# Patient Record
Sex: MALE | Race: WHITE | NOT HISPANIC OR LATINO | Employment: FULL TIME | URBAN - METROPOLITAN AREA
[De-identification: names, ages, dates, MRNs, and addresses within clinical notes are randomized per-mention and may not be internally consistent; named-entity substitution may affect disease eponyms.]

---

## 2017-10-09 NOTE — PROGRESS NOTES
SUBJECTIVE:   CC: Ever Garnica is an 52 year old male who presents for preventative health visit.     The patient is doing well and works out very reg.  No skin outbreaks or herpes issues.  For 2 months when bends over some tightness in lb, not any other time, no leg t/n/w, no incont, no f,c,s.  No gi or gu c/o.  No other c/o on review of systems.    Healthy Habits:    Do you get at least three servings of calcium containing foods daily (dairy, green leafy vegetables, etc.)? yes    Amount of exercise or daily activities, outside of work: 7 day(s) per week    Problems taking medications regularly No    Medication side effects: No    Have you had an eye exam in the past two years? yes    Do you see a dentist twice per year? yes    Do you have sleep apnea, excessive snoring or daytime drowsiness?no              Today's PHQ-2 Score: PHQ-2 ( 1999 Pfizer) 3/3/2016 2/9/2016   Q1: Little interest or pleasure in doing things 0 0   Q2: Feeling down, depressed or hopeless 0 0   PHQ-2 Score 0 0         Abuse: Current or Past(Physical, Sexual or Emotional)- No  Do you feel safe in your environment - Yes  Social History   Substance Use Topics     Smoking status: Never Smoker     Smokeless tobacco: Never Used     Alcohol use 0.0 oz/week     0 Standard drinks or equivalent per week      Comment: rarely     The patient does not drink >3 drinks per day nor >7 drinks per week.                Past Medical History:      Past Medical History:   Diagnosis Date     Genital herpes 2015    buttock paresthesia, urine symptoms     H/O colonoscopy 2016    nl     Hypothyroid 8/15    not on meds     Seasonal allergies              Past Surgical History:      Past Surgical History:   Procedure Laterality Date     C ANESTH,REPAIR LO ABD HERNIA NOS      age less than 10 years     clavicular surgery for fx  2013     COLONOSCOPY N/A 3/10/2016    Procedure: COLONOSCOPY;  Surgeon: Frank Ayala MD;  Location:  GI     HERNIA REPAIR  2005    bilat      VASECTOMY  10/2008             Social History:     Social History     Social History     Marital status:      Spouse name: N/A     Number of children: 3     Years of education: N/A     Occupational History     , [employer]     Social History Main Topics     Smoking status: Never Smoker     Smokeless tobacco: Never Used     Alcohol use No      Comment: 0     Drug use: No     Sexual activity: Yes     Partners: Female     Other Topics Concern      Service No     Blood Transfusions No     Exercise No     not scheduled     Seat Belt Yes     Social History Narrative             Family History:   reviewed         Allergies:   No Known Allergies          Medications:     Current Outpatient Prescriptions   Medication Sig Dispense Refill     fluticasone (FLONASE) 50 MCG/ACT nasal spray Spray 2 sprays into both nostrils daily (Patient not taking: Reported on 10/10/2017) 1 Package 5               Review of Systems:   The 10 point Review of Systems is negative other than noted in the HPI           Physical Exam:   Blood pressure 126/77, pulse 71, temperature 97.8  F (36.6  C), temperature source Oral, height 6' (1.829 m), weight 185 lb (83.9 kg), SpO2 98 %.    Exam:  Constitutional: healthy appearing, alert and in no distress  Heent: Normocephalic. Head without obvious masses or lesions. PERRLDC, EOMI. Mouth exam within normal limits: tongue, mucous membranes, posterior pharynx all normal, no lesions or abnormalities seen.  Tm's and canals within normal limits bilaterally. Neck supple, no nuchal rigidity or masses. No supraclavicular, or cervical adenopathy. Thyroid symmetric, no masses.  Cardiovascular: Regular rate and rhythm, no murmer, rub or gallops.  JVP not elevated, no edema.  Carotids within normal limits bilaterally, no bruits.  Respiratory: Normal respiratory effort.  Lungs clear, normal flow, no wheezing or crackles.  Breasts: Normal bilaterally.  No masses or  lesions.  Nipples within normal limites.  No axillary lesions or nodes.  Gastrointestinal: Normal active bowel sounds.   Soft, not tender, no masses, guarding or rebound.  No hepatosplenomegaly.   Genitourinary: Rectal min bph  Musculoskeletal: extremities normal, no gross deformities noted.  Back within normal limits, no lesions or tenderness  Skin: no suspicious lesions or rashes   Neurologic: Mental status within normal limits.  Speech fluent.  No gross motor abnormalities and gait intact.  Psychiatric: mentation appears normal and affect normal.         Data:   Labs sent        Assessment:   1. Normal cpx  2. Low back pain, suspect msk, doubt tumor, cord, etc  3. Hypothyroidism, follow up labs  4. Hsv, no issues  5. Allergies  6. hcm         Plan:   Letter with labs  Call if cont low back pain  Exercise, diet  Up to date colon and immunizations         Frank Elizabeth M.D.

## 2017-10-10 ENCOUNTER — OFFICE VISIT (OUTPATIENT)
Dept: FAMILY MEDICINE | Facility: CLINIC | Age: 52
End: 2017-10-10
Payer: COMMERCIAL

## 2017-10-10 VITALS
DIASTOLIC BLOOD PRESSURE: 77 MMHG | WEIGHT: 185 LBS | BODY MASS INDEX: 25.06 KG/M2 | TEMPERATURE: 97.8 F | OXYGEN SATURATION: 98 % | HEART RATE: 71 BPM | SYSTOLIC BLOOD PRESSURE: 126 MMHG | HEIGHT: 72 IN

## 2017-10-10 DIAGNOSIS — J30.2 SEASONAL ALLERGIC RHINITIS, UNSPECIFIED CHRONICITY, UNSPECIFIED TRIGGER: ICD-10-CM

## 2017-10-10 DIAGNOSIS — E03.9 HYPOTHYROIDISM, UNSPECIFIED TYPE: ICD-10-CM

## 2017-10-10 DIAGNOSIS — A60.00 HERPES SIMPLEX INFECTION OF GENITOURINARY SYSTEM: ICD-10-CM

## 2017-10-10 DIAGNOSIS — Z00.00 ROUTINE GENERAL MEDICAL EXAMINATION AT A HEALTH CARE FACILITY: Primary | ICD-10-CM

## 2017-10-10 LAB
ERYTHROCYTE [DISTWIDTH] IN BLOOD BY AUTOMATED COUNT: 13.6 % (ref 10–15)
HCT VFR BLD AUTO: 45.1 % (ref 40–53)
HGB BLD-MCNC: 15.8 G/DL (ref 13.3–17.7)
MCH RBC QN AUTO: 31.3 PG (ref 26.5–33)
MCHC RBC AUTO-ENTMCNC: 35 G/DL (ref 31.5–36.5)
MCV RBC AUTO: 89 FL (ref 78–100)
PLATELET # BLD AUTO: 209 10E9/L (ref 150–450)
RBC # BLD AUTO: 5.05 10E12/L (ref 4.4–5.9)
WBC # BLD AUTO: 3.7 10E9/L (ref 4–11)

## 2017-10-10 PROCEDURE — 80053 COMPREHEN METABOLIC PANEL: CPT | Performed by: INTERNAL MEDICINE

## 2017-10-10 PROCEDURE — 84443 ASSAY THYROID STIM HORMONE: CPT | Performed by: INTERNAL MEDICINE

## 2017-10-10 PROCEDURE — 85027 COMPLETE CBC AUTOMATED: CPT | Performed by: INTERNAL MEDICINE

## 2017-10-10 PROCEDURE — 84439 ASSAY OF FREE THYROXINE: CPT | Performed by: INTERNAL MEDICINE

## 2017-10-10 PROCEDURE — G0103 PSA SCREENING: HCPCS | Performed by: INTERNAL MEDICINE

## 2017-10-10 PROCEDURE — 99396 PREV VISIT EST AGE 40-64: CPT | Performed by: INTERNAL MEDICINE

## 2017-10-10 PROCEDURE — 80061 LIPID PANEL: CPT | Performed by: INTERNAL MEDICINE

## 2017-10-10 PROCEDURE — 36415 COLL VENOUS BLD VENIPUNCTURE: CPT | Performed by: INTERNAL MEDICINE

## 2017-10-10 NOTE — NURSING NOTE
Chief Complaint   Patient presents with     Physical       Initial /77  Pulse 71  Temp 97.8  F (36.6  C) (Oral)  Ht 6' (1.829 m)  Wt 185 lb (83.9 kg)  SpO2 98%  BMI 25.09 kg/m2 Estimated body mass index is 25.09 kg/(m^2) as calculated from the following:    Height as of this encounter: 6' (1.829 m).    Weight as of this encounter: 185 lb (83.9 kg).  Medication Reconciliation: bob FUENTES CMA

## 2017-10-10 NOTE — LETTER
[facility]  [address] [facility] [address]  [place]  [address]  Tel: [phone]    October 11, 2017    Ever Garnica  [address] [address]        Dear Mr. Bryantman,    I am happy to report that your cbc or complete blood count is normal with no signs of anemia, leukemia or platelet abnormalities.  Your chemistry panel shows no signs of diabetes.  Your blood salts, kidney tests, liver tests, psa, thyroid tests, and proteins are all fine.    Your total cholesterol is 210 with the normal range being below 200.  Your HDL or good cholesterol is 74 with the normal range being above 40.  Your LDL or bad cholesterol is 125 with the normal range being below 130.  These numbers are all improved from before.    I am happy to bring you this overall excellent report.  I believe your health is very good.     If you have any further questions or problems, please contact our office.      Sincerely,    Frank Elizabeth MD/ Gabriella FUENTES CMA  Results for orders placed or performed in visit on 10/10/17   CBC with platelets   Result Value Ref Range    WBC 3.7 (L) 4.0 - 11.0 10e9/L    RBC Count 5.05 4.4 - 5.9 10e12/L    Hemoglobin 15.8 13.3 - 17.7 g/dL    Hematocrit 45.1 40.0 - 53.0 %    MCV 89 78 - 100 fl    MCH 31.3 26.5 - 33.0 pg    MCHC 35.0 31.5 - 36.5 g/dL    RDW 13.6 10.0 - 15.0 %    Platelet Count 209 150 - 450 10e9/L   Comprehensive metabolic panel   Result Value Ref Range    Sodium 140 133 - 144 mmol/L    Potassium 4.5 3.4 - 5.3 mmol/L    Chloride 105 94 - 109 mmol/L    Carbon Dioxide 25 20 - 32 mmol/L    Anion Gap 10 3 - 14 mmol/L    Glucose 94 70 - 99 mg/dL    Urea Nitrogen 21 7 - 30 mg/dL    Creatinine 1.14 0.66 - 1.25 mg/dL    GFR Estimate 67 >60 mL/min/1.7m2    GFR Estimate If Black 82 >60 mL/min/1.7m2    Calcium 9.0 8.5 - 10.1 mg/dL    Bilirubin Total 0.6 0.2 - 1.3 mg/dL    Albumin 4.0 3.4 - 5.0 g/dL    Protein Total 7.7 6.8 - 8.8 g/dL    Alkaline Phosphatase 60 40 - 150 U/L    ALT 18 0  - 70 U/L    AST 25 0 - 45 U/L   Lipid panel reflex to direct LDL   Result Value Ref Range    Cholesterol 210 (H) <200 mg/dL    Triglycerides 57 <150 mg/dL    HDL Cholesterol 74 >39 mg/dL    LDL Cholesterol Calculated 125 (H) <100 mg/dL    Non HDL Cholesterol 136 (H) <130 mg/dL   Prostate spec antigen screen   Result Value Ref Range    PSA 0.72 0 - 4 ug/L   TSH with free T4 reflex   Result Value Ref Range    TSH 5.20 (H) 0.40 - 4.00 mU/L   T4 free   Result Value Ref Range    T4 Free 1.06 0.76 - 1.46 ng/dL               Enclosure: Lab Results

## 2017-10-10 NOTE — MR AVS SNAPSHOT
After Visit Summary   10/10/2017    Ever Garnica    MRN: [id number]           Patient Information     Date Of Birth          1965        Visit Information        Provider Department      10/10/2017 11:30 AM Frank Elizabeth MD [facility]        Today's Diagnoses     Routine general medical examination at a health care facility    -  1    Hypothyroidism, unspecified type        Herpes simplex infection of genitourinary system        Seasonal allergic rhinitis, unspecified chronicity, unspecified trigger          Care Instructions      Preventive Health Recommendations  Male Ages 50 - 64    Yearly exam:             See your health care provider every year in order to  o   Review health changes.   o   Discuss preventive care.    o   Review your medicines if your doctor has prescribed any.     Have a cholesterol test every 5 years, or more frequently if you are at risk for high cholesterol/heart disease.     Have a diabetes test (fasting glucose) every three years. If you are at risk for diabetes, you should have this test more often.     Have a colonoscopy at age 50, or have a yearly FIT test (stool test). These exams will check for colon cancer.      Talk with your health care provider about whether or not a prostate cancer screening test (PSA) is right for you.    You should be tested each year for STDs (sexually transmitted diseases), if you re at risk.     Shots: Get a flu shot each year. Get a tetanus shot every 10 years.     Nutrition:    Eat at least 5 servings of fruits and vegetables daily.     Eat whole-grain bread, whole-wheat pasta and brown rice instead of white grains and rice.     Talk to your provider about Calcium and Vitamin D.     Lifestyle    Exercise for at least 150 minutes a week (30 minutes a day, 5 days a week). This will help you control your weight and prevent disease.     Limit alcohol to one drink per day.     No smoking.     Wear sunscreen to prevent  skin cancer.     See your dentist every six months for an exam and cleaning.     See your eye doctor every 1 to 2 years.            Follow-ups after your visit        Your next 10 appointments already scheduled     Oct 10, 2017 11:30 AM CDT   [facility]YS[facility] with [facility] MD   [facility]    [address] [facility] [address] [phone]              Who to contact     If you have questions or need follow up information about today's clinic visit or your schedule please contact [facility] directly at [phone].  Normal or non-critical lab and imaging results will be communicated to you by [employer], letter or phone within 4 business days after the clinic has received the results. If you do not hear from us within 7 days, please contact the clinic through [employer] or phone. If you have a critical or abnormal lab result, we will notify you by phone as soon as possible.  Submit refill requests through [employer] or call your pharmacy and they will forward the refill request to us. Please allow 3 business days for your refill to be completed.          Additional Information About Your Visit        Hi-[employer] Information     [employer] gives you secure access to your electronic health record. If you see a primary care provider, you can also send messages to your care team and make appointments. If you have questions, please call your primary care clinic.  If you do not have a primary care provider, please call [phone] and they will assist you.        Care EveryWhere ID     This is your Care EveryWhere ID. This could be used by other organizations to access your [place] medical records  [id number]        Your Vitals Were     Pulse Temperature Height Pulse Oximetry BMI (Body Mass Index)       71 97.8  F (36.6  C) (Oral) 6' (1.829 m) 98% 25.09 kg/m2        Blood Pressure from Last 3 Encounters:   10/10/17 126/77   03/10/16 107/74   03/03/16 114/72    Weight from  Last 3 Encounters:   10/10/17 185 lb (83.9 kg)   03/03/16 185 lb (83.9 kg)   02/28/16 185 lb (83.9 kg)              We Performed the Following     CBC with platelets     Comprehensive metabolic panel     Lipid panel reflex to direct LDL     Prostate spec antigen screen     TSH with free T4 reflex        Primary Care Provider Office Phone # Fax #    Frank Elizabeth [phone] [address]  [facility] [address]        Equal Access to Services     MARISELA GROVER : Hadii aad ku hadasho Soomaali, waaxda luqadaha, qaybta kaalmada adeegyada, waxay idiin hayaan adeeg khyvonne banks . So [facility] [phone].    ATENCIÓN: Si habla español, tiene a orellana disposición servicios gratuitos de asistencia lingüística. [facility] [phone].    We comply with applicable federal civil rights laws and [place] laws. We do not discriminate on the basis of race, color, national origin, age, disability, sex, sexual orientation, or gender identity.            Thank you!     Thank you for choosing [facility]  for your care. Our goal is always to provide you with excellent care. Hearing back from our patients is one way we can continue to improve our services. Please take a few minutes to complete the written survey that you may receive in the mail after your visit with us. Thank you!             Your Updated Medication List - Protect others around you: Learn how to safely use, store and throw away your medicines at www.disposemymeds.org.          This list is accurate as of: 10/10/17 11:28 AM.  Always use your most recent med list.                   Brand Name Dispense Instructions for use Diagnosis    fluticasone 50 MCG/ACT spray    FLONASE    1 Package    Spray 2 sprays into both nostrils daily    Seasonal allergic rhinitis

## 2017-10-11 LAB
ALBUMIN SERPL-MCNC: 4 G/DL (ref 3.4–5)
ALP SERPL-CCNC: 60 U/L (ref 40–150)
ALT SERPL W P-5'-P-CCNC: 18 U/L (ref 0–70)
ANION GAP SERPL CALCULATED.3IONS-SCNC: 10 MMOL/L (ref 3–14)
AST SERPL W P-5'-P-CCNC: 25 U/L (ref 0–45)
BILIRUB SERPL-MCNC: 0.6 MG/DL (ref 0.2–1.3)
BUN SERPL-MCNC: 21 MG/DL (ref 7–30)
CALCIUM SERPL-MCNC: 9 MG/DL (ref 8.5–10.1)
CHLORIDE SERPL-SCNC: 105 MMOL/L (ref 94–109)
CHOLEST SERPL-MCNC: 210 MG/DL
CO2 SERPL-SCNC: 25 MMOL/L (ref 20–32)
CREAT SERPL-MCNC: 1.14 MG/DL (ref 0.66–1.25)
GFR SERPL CREATININE-BSD FRML MDRD: 67 ML/MIN/1.7M2
GLUCOSE SERPL-MCNC: 94 MG/DL (ref 70–99)
HDLC SERPL-MCNC: 74 MG/DL
LDLC SERPL CALC-MCNC: 125 MG/DL
NONHDLC SERPL-MCNC: 136 MG/DL
POTASSIUM SERPL-SCNC: 4.5 MMOL/L (ref 3.4–5.3)
PROT SERPL-MCNC: 7.7 G/DL (ref 6.8–8.8)
PSA SERPL-ACNC: 0.72 UG/L (ref 0–4)
SODIUM SERPL-SCNC: 140 MMOL/L (ref 133–144)
T4 FREE SERPL-MCNC: 1.06 NG/DL (ref 0.76–1.46)
TRIGL SERPL-MCNC: 57 MG/DL
TSH SERPL DL<=0.005 MIU/L-ACNC: 5.2 MU/L (ref 0.4–4)

## 2017-10-11 NOTE — PROGRESS NOTES
Mr. Garnica,    It was a pleasure seeing you for your physical examination.  I wanted to get back to you with your test results.  I have enclosed a copy for your review.      I am happy to report that your cbc or complete blood count is normal with no signs of anemia, leukemia or platelet abnormalities.  Your chemistry panel shows no signs of diabetes.  Your blood salts, kidney tests, liver tests, psa, thyroid tests, and proteins are all fine.    Your total cholesterol is 210 with the normal range being below 200.  Your HDL or good cholesterol is 74 with the normal range being above 40.  Your LDL or bad cholesterol is 125 with the normal range being below 130.  These numbers are all improved from before.    I am happy to bring you this overall excellent report.  I believe your health is very good.  If you have any questions please call me.    Frank Elizabeth M.D.

## 2019-03-15 ENCOUNTER — ANCILLARY PROCEDURE (OUTPATIENT)
Dept: GENERAL RADIOLOGY | Facility: CLINIC | Age: 54
End: 2019-03-15
Attending: INTERNAL MEDICINE
Payer: COMMERCIAL

## 2019-03-15 ENCOUNTER — OFFICE VISIT (OUTPATIENT)
Dept: FAMILY MEDICINE | Facility: CLINIC | Age: 54
End: 2019-03-15
Payer: COMMERCIAL

## 2019-03-15 VITALS
TEMPERATURE: 98.2 F | SYSTOLIC BLOOD PRESSURE: 112 MMHG | WEIGHT: 197 LBS | HEART RATE: 69 BPM | BODY MASS INDEX: 26.68 KG/M2 | OXYGEN SATURATION: 96 % | HEIGHT: 72 IN | DIASTOLIC BLOOD PRESSURE: 70 MMHG

## 2019-03-15 DIAGNOSIS — M54.42 CHRONIC LEFT-SIDED LOW BACK PAIN WITH LEFT-SIDED SCIATICA: ICD-10-CM

## 2019-03-15 DIAGNOSIS — M79.672 LEFT FOOT PAIN: ICD-10-CM

## 2019-03-15 DIAGNOSIS — G89.29 CHRONIC LEFT-SIDED LOW BACK PAIN WITH LEFT-SIDED SCIATICA: ICD-10-CM

## 2019-03-15 DIAGNOSIS — M79.672 LEFT FOOT PAIN: Primary | ICD-10-CM

## 2019-03-15 PROCEDURE — 99214 OFFICE O/P EST MOD 30 MIN: CPT | Performed by: INTERNAL MEDICINE

## 2019-03-15 PROCEDURE — 73630 X-RAY EXAM OF FOOT: CPT | Mod: LT

## 2019-03-15 PROCEDURE — 72100 X-RAY EXAM L-S SPINE 2/3 VWS: CPT

## 2019-03-15 ASSESSMENT — MIFFLIN-ST. JEOR: SCORE: 1776.59

## 2019-03-15 NOTE — PROGRESS NOTES
SUBJECTIVE:   Ever Garnica is a 53 year old male who presents to clinic today for the following health issues:      Left foot pain on and off for a couple of months, no known injuries      Left foot Pain    Duration:     Since: several months           Specific cause: none    Description:      Location of pain: left lateral foot     Character of pain: dull     Pain radiation: none    Intensity: moderate    History:      Pain interferes with job: no     History of similar pain problems: yes     Any previous MRI or X-rays: no     Therapies tried without relief: none    Alleviating factors:      Improved by: none    Precipitating factors:    Worsened by: walking long distance    Accompanying Signs & Symptoms: left sciatica            Current Medications:     Current Outpatient Medications   Medication Sig Dispense Refill     fluticasone (FLONASE) 50 MCG/ACT nasal spray Spray 2 sprays into both nostrils daily (Patient not taking: Reported on 10/10/2017) 1 Package 5         Allergies:    No Known Allergies         Past Medical History:     Past Medical History:   Diagnosis Date     Genital herpes 2015    buttock paresthesia, urine symptoms     H/O colonoscopy 2016    nl     Hypothyroid 8/15    not on meds     Seasonal allergies          Past Surgical History:     Past Surgical History:   Procedure Laterality Date     C ANESTH,REPAIR LO ABD HERNIA NOS      age less than 10 years     clavicular surgery for fx  2013     COLONOSCOPY N/A 3/10/2016    Procedure: COLONOSCOPY;  Surgeon: Frank Ayala MD;  Location:  GI     HERNIA REPAIR  2005    bilat     VASECTOMY  10/2008         Family Medical History:     Family History   Problem Relation Age of Onset     Lipids Father      C.A.D. Paternal Grandmother         heart attack late 60's, no other heart disease in the family     Cancer - colorectal Paternal Uncle         but no closer fam hx of colon cancer     Diabetes No family hx of      Cerebrovascular Disease No  family hx of      Breast Cancer No family hx of      Prostate Cancer No family hx of      Eye Disorder No family hx of         no glaucoma         Social History:     Social History     Socioeconomic History     Marital status:      Spouse name: Not on file     Number of children: 3     Years of education: Not on file     Highest education level: Not on file   Occupational History     Occupation: , Mathmetician     Employer: [employer]   Social Needs     Financial resource strain: Not on file     Food insecurity:     Worry: Not on file     Inability: Not on file     Transportation needs:     Medical: Not on file     Non-medical: Not on file   Tobacco Use     Smoking status: Never Smoker     Smokeless tobacco: Never Used   Substance and Sexual Activity     Alcohol use: No     Alcohol/week: 0.0 oz     Comment: 0     Drug use: No     Sexual activity: Yes     Partners: Female   Lifestyle     Physical activity:     Days per week: Not on file     Minutes per session: Not on file     Stress: Not on file   Relationships     Social connections:     Talks on phone: Not on file     Gets together: Not on file     Attends Restoration service: Not on file     Active member of club or organization: Not on file     Attends meetings of clubs or organizations: Not on file     Relationship status: Not on file     Intimate partner violence:     Fear of current or ex partner: Not on file     Emotionally abused: Not on file     Physically abused: Not on file     Forced sexual activity: Not on file   Other Topics Concern      Service No     Blood Transfusions No     Caffeine Concern Not Asked     Occupational Exposure Not Asked     Hobby Hazards Not Asked     Sleep Concern Not Asked     Stress Concern Not Asked     Weight Concern Not Asked     Special Diet Not Asked     Back Care Not Asked     Exercise No     Comment: not scheduled     Bike Helmet Not Asked     Seat Belt Yes     Self-Exams Not Asked      Parent/sibling w/ CABG, MI or angioplasty before 65F 55M? Not Asked   Social History Narrative     Not on file           Review of System:     Constitutional: Negative for fever or chills  Skin: Negative for rashes  Ears/Nose/Throat: Negative for nasal congestion, sore throat  Respiratory: No shortness of breath, dyspnea on exertion, cough, or hemoptysis  Cardiovascular: Negative for chest pain  Gastrointestinal: Negative for nausea, vomiting  Genitourinary: Negative for dysuria, hematuria  Musculoskeletal: Positive for mechanical low back pains and left foot pains  Neurologic: Negative for headaches  Psychiatric: Negative for depression, anxiety  Hematologic/Lymphatic/Immunologic: Negative  Endocrine: Negative  Behavioral: Negative for tobacco use       Physical Exam:   /70 (BP Location: Left arm, Patient Position: Sitting, Cuff Size: Adult Regular)   Pulse 69   Temp 98.2  F (36.8  C) (Oral)   Ht 1.829 m (6')   Wt 89.4 kg (197 lb)   SpO2 96%   BMI 26.72 kg/m      GENERAL: alert and no distress  EYES: eyes grossly normal to inspection, and conjunctivae and sclerae normal  HENT: Normocephalic atraumatic. Nose and mouth without ulcers or lesions  NECK: supple  RESP: lungs clear to auscultation   CV: regular rate and rhythm, normal S1 S2  LYMPH: no peripheral edema   ABDOMEN: nondistended  MS: left lower back pains with sciatica symptoms and left lateral foot pains noted with no signs of acute trauma or injuries  SKIN: no suspicious lesions or rashes  NEURO: Alert & Oriented x 3.   PSYCH: mentation appears normal, affect normal        Diagnostic Test Results:     Diagnostic Test Results:  Results for orders placed or performed in visit on 10/10/17   CBC with platelets   Result Value Ref Range    WBC 3.7 (L) 4.0 - 11.0 10e9/L    RBC Count 5.05 4.4 - 5.9 10e12/L    Hemoglobin 15.8 13.3 - 17.7 g/dL    Hematocrit 45.1 40.0 - 53.0 %    MCV 89 78 - 100 fl    MCH 31.3 26.5 - 33.0 pg    MCHC 35.0 31.5 - 36.5 g/dL     RDW 13.6 10.0 - 15.0 %    Platelet Count 209 150 - 450 10e9/L   Comprehensive metabolic panel   Result Value Ref Range    Sodium 140 133 - 144 mmol/L    Potassium 4.5 3.4 - 5.3 mmol/L    Chloride 105 94 - 109 mmol/L    Carbon Dioxide 25 20 - 32 mmol/L    Anion Gap 10 3 - 14 mmol/L    Glucose 94 70 - 99 mg/dL    Urea Nitrogen 21 7 - 30 mg/dL    Creatinine 1.14 0.66 - 1.25 mg/dL    GFR Estimate 67 >60 mL/min/1.7m2    GFR Estimate If Black 82 >60 mL/min/1.7m2    Calcium 9.0 8.5 - 10.1 mg/dL    Bilirubin Total 0.6 0.2 - 1.3 mg/dL    Albumin 4.0 3.4 - 5.0 g/dL    Protein Total 7.7 6.8 - 8.8 g/dL    Alkaline Phosphatase 60 40 - 150 U/L    ALT 18 0 - 70 U/L    AST 25 0 - 45 U/L   Lipid panel reflex to direct LDL   Result Value Ref Range    Cholesterol 210 (H) <200 mg/dL    Triglycerides 57 <150 mg/dL    HDL Cholesterol 74 >39 mg/dL    LDL Cholesterol Calculated 125 (H) <100 mg/dL    Non HDL Cholesterol 136 (H) <130 mg/dL   Prostate spec antigen screen   Result Value Ref Range    PSA 0.72 0 - 4 ug/L   TSH with free T4 reflex   Result Value Ref Range    TSH 5.20 (H) 0.40 - 4.00 mU/L   T4 free   Result Value Ref Range    T4 Free 1.06 0.76 - 1.46 ng/dL       ASSESSMENT/PLAN:       (M79.672) Left foot pain  (primary encounter diagnosis)  Comment: chronic left foot pains of unclear etiology  Plan: XR Foot Left G/E 3 Views, PODIATRY/FOOT & ANKLE        SURGERY REFERRAL, in the meantime, the patient is advised to take OTC pain medication including low dose tylenol for pain relief going forward.      (M54.42,  G89.29) Chronic left-sided low back pain with left-sided sciatica  Comment: chronic left sided low back pains with left sciatica  Plan: ORTHO  REFERRAL, XR Lumbar Spine 2/3 Views, in the meantime, the patient is advised to take OTC pain medication including low dose tylenol for pain relief going forward.    Follow Up Plan:     Patient is instructed to return to Internal Medicine clinic for follow-up visit in 1  week.        Fabiana Cabral MD  Internal Medicine  [facility]

## 2019-03-18 ENCOUNTER — OFFICE VISIT (OUTPATIENT)
Dept: NEUROSURGERY | Facility: CLINIC | Age: 54
End: 2019-03-18
Attending: PHYSICIAN ASSISTANT
Payer: COMMERCIAL

## 2019-03-18 VITALS
HEIGHT: 72 IN | SYSTOLIC BLOOD PRESSURE: 118 MMHG | DIASTOLIC BLOOD PRESSURE: 68 MMHG | HEART RATE: 63 BPM | WEIGHT: 190 LBS | OXYGEN SATURATION: 97 % | TEMPERATURE: 98.5 F | BODY MASS INDEX: 25.73 KG/M2

## 2019-03-18 DIAGNOSIS — R20.2 PARESTHESIAS: Primary | ICD-10-CM

## 2019-03-18 DIAGNOSIS — M79.605 LEFT LEG PAIN: ICD-10-CM

## 2019-03-18 PROCEDURE — 99203 OFFICE O/P NEW LOW 30 MIN: CPT | Performed by: PHYSICIAN ASSISTANT

## 2019-03-18 PROCEDURE — G0463 HOSPITAL OUTPT CLINIC VISIT: HCPCS

## 2019-03-18 ASSESSMENT — PAIN SCALES - GENERAL: PAINLEVEL: MILD PAIN (2)

## 2019-03-18 ASSESSMENT — MIFFLIN-ST. JEOR: SCORE: 1744.83

## 2019-03-18 NOTE — NURSING NOTE
Ever Garnica is a 53 year old male who presents for:  Chief Complaint   Patient presents with     Pain     Chronic left foot pain and numbness that also occurs in upper leg as well and into the buttock.         Initial Vitals:  /68 (BP Location: Left arm, Patient Position: Sitting, Cuff Size: Adult Large)   Pulse 63   Temp 98.5  F (36.9  C) (Oral)   Ht 6' (1.829 m)   Wt 190 lb (86.2 kg)   SpO2 97%   BMI 25.77 kg/m   Estimated body mass index is 25.77 kg/m  as calculated from the following:    Height as of this encounter: 6' (1.829 m).    Weight as of this encounter: 190 lb (86.2 kg).. Body surface area is 2.09 meters squared. BP completed using cuff size: large  Mild Pain (2)        Nursing Comments: Chronic left foot pain and numbness that occasionally also occurs in the upper leg and into the buttock. Patient rates his pain level today at a 2.         Nancy Manjarrez

## 2019-03-18 NOTE — LETTER
3/18/2019         RE: Ever aGrnica  [address]  [place] [address]        Dear Colleague,    Thank you for referring your patient, Ever Garnica, to the [facility]. Please see a copy of my visit note below.    Dr. Lito Dyson  [facility]  Neurosurgery Clinic Visit      CC: Left leg and foot pain    Primary care Provider: Frank Elizabeth      Reason For Visit:   I was asked by Claudio Cabral MD to consult on the patient for chrnoic left sided low back pain with left sided sciatica.      HPI: Ever Garnica is a 53 year old male who presents for evaluation of left leg and foot pain x 2-3 years. Pain is located in posterior left leg and radiates down into foot. Describes the pain as intermittent numbness and tingling in the leg and an intermittent pain in the left foot. He is having numbness in 4th toe. Pain is worsened with skiing, hiking, and biking. He has been doing stretching exercises, which have slightly helped alleviate his symptoms, but never fully.  No recent MRI, PT, or injections. He does have recent left foot xray with mild degenerative changes. Denies foot drop or bladder/bowel incontinence.    Current pain: 1/10 At worst: 7-8/10 in foot    Past Medical History:   Diagnosis Date     Genital herpes 2015    buttock paresthesia, urine symptoms     H/O colonoscopy 2016    nl     Hypothyroid 8/15    not on meds     Seasonal allergies        Past Medical History reviewed with patient during visit.    Past Surgical History:   Procedure Laterality Date     C ANESTH,REPAIR LO ABD HERNIA NOS      age less than 10 years     clavicular surgery for fx  2013     COLONOSCOPY N/A 3/10/2016    Procedure: COLONOSCOPY;  Surgeon: Frank Ayala MD;  Location:  GI     HERNIA REPAIR  2005    bilat     VASECTOMY  10/2008     Past Surgical History reviewed with patient during visit.    Current Outpatient Medications   Medication     fluticasone  (FLONASE) 50 MCG/ACT nasal spray     No current facility-administered medications for this visit.        No Known Allergies    Social History     Socioeconomic History     Marital status:      Spouse name: Not on file     Number of children: 3     Years of education: Not on file     Highest education level: Not on file   Occupational History     Occupation: , Blossommetician     Employer: [employer]   Social Needs     Financial resource strain: Not on file     Food insecurity:     Worry: Not on file     Inability: Not on file     Transportation needs:     Medical: Not on file     Non-medical: Not on file   Tobacco Use     Smoking status: Never Smoker     Smokeless tobacco: Never Used   Substance and Sexual Activity     Alcohol use: No     Alcohol/week: 0.0 oz     Comment: 0     Drug use: No     Sexual activity: Yes     Partners: Female   Lifestyle     Physical activity:     Days per week: Not on file     Minutes per session: Not on file     Stress: Not on file   Relationships     Social connections:     Talks on phone: Not on file     Gets together: Not on file     Attends [employer] service: Not on file     Active member of club or organization: Not on file     Attends meetings of clubs or organizations: Not on file     Relationship status: Not on file     Intimate partner violence:     Fear of current or ex partner: Not on file     Emotionally abused: Not on file     Physically abused: Not on file     Forced sexual activity: Not on file   Other Topics Concern      Service No     Blood Transfusions No     Caffeine Concern Not Asked     Occupational Exposure Not Asked     Hobby Hazards Not Asked     Sleep Concern Not Asked     Stress Concern Not Asked     Weight Concern Not Asked     Special Diet Not Asked     Back Care Not Asked     Exercise No     Comment: not scheduled     Bike Helmet Not Asked     Seat Belt Yes     Self-Exams Not Asked     Parent/sibling w/ CABG, MI or angioplasty  before 65F 55M? Not Asked   Social History Narrative     Not on file       Family History   Problem Relation Age of Onset     Lipids Father      C.A.D. Paternal Grandmother         heart attack late 60's, no other heart disease in the family     Cancer - colorectal Paternal Uncle         but no closer fam hx of colon cancer     Diabetes No family hx of      Cerebrovascular Disease No family hx of      Breast Cancer No family hx of      Prostate Cancer No family hx of      Eye Disorder No family hx of         no glaucoma          ROS: 10 point ROS neg other than the symptoms noted above in the HPI.    Vital Signs: There were no vitals taken for this visit.    Examination:  Constitutional:  Alert, well nourished, NAD.  HEENT: Normocephalic, atraumatic.   Pulmonary:  Without shortness of breath, normal effort.   Lymph: no lymphadenopathy to low back or LE.   Integumentary: Skin is free of rashes or lesions.   Cardiovascular:  No pitting edema of BLE.      Neurological:  Awake  Alert  Oriented x 3  Speech clear  Cranial nerves II - XII grossly intact  PERRL  EOMI  Face symmetric  Tongue midline  Motor exam   Hip Flexor:                Right: 5/5  Left:  5/5  Hip Adductor:             Right:  5/5  Left:  5/5  Hip Abductor:             Right:  5/5  Left:  5/5  Gastroc Soleus:        Right:  5/5  Left:  5/5  Tib/Ant:                      Right:  5/5  Left:  5/5  EHL:                          Right:  5/5  Left:  5/5       Sensation normal to bilateral lower extremities.    Reflexes are 2+ in the patellar and Achilles. There is no clonus. Downgoing Babinski.  Musculoskeletal:  Gait: Able to stand from a seated position. Normal non-antalgic, non-myelopathic gait.  Able to heel/toe walk without loss of balance  Lumbar examination reveals no tenderness of the spine or paraspinous muscles.  Hip height is symmetrical. Negative SI joint, sciatic notch or greater trochanteric tenderness to palpation bilaterally.  Straight leg raise  causes increase in symptoms on the left.    Imaging:   XR of the lumbar spine from 3/15/2019 was reviewed in the office today. Reveals no acute changes.    Assessment/Plan:   Ever Garnica is a 53 year old male who presents for evaluation of left leg and foot pain x 2-3 years. Pain is located in posterior left leg and radiates down into foot. Describes the pain as intermittent numbness and tingling in the leg and an intermittent pain in the left foot. He is having numbness in 4th toe. Lumbar XR reviewed in office. Left foot xray with only mild degenerative changes. No weakness on exam. Will obtain lumbar MRI as xray does not explain the ongoing paresthesias and left foot pain and contact him with the results. Patient voiced understanding and agreement.            Jackelyn Whittaker PA-C  [facility]  [address] [address]    Tel [phone]  Pager [phone]      Again, thank you for allowing me to participate in the care of your patient.        Sincerely,        Jackelyn Whittaker PA-C

## 2019-03-18 NOTE — PROGRESS NOTES
Dr. Lito Dyson  [facility]  Neurosurgery Clinic Visit      CC: Left leg and foot pain    Primary care Provider: Frank Elizabeth      Reason For Visit:   I was asked by Claudio Cabral MD to consult on the patient for chrnoic left sided low back pain with left sided sciatica.      HPI: Ever Garnica is a 53 year old male who presents for evaluation of left leg and foot pain x 2-3 years. Pain is located in posterior left leg and radiates down into foot. Describes the pain as intermittent numbness and tingling in the leg and an intermittent pain in the left foot. He is having numbness in 4th toe. Pain is worsened with skiing, hiking, and biking. He has been doing stretching exercises, which have slightly helped alleviate his symptoms, but never fully.  No recent MRI, PT, or injections. He does have recent left foot xray with mild degenerative changes. Denies foot drop or bladder/bowel incontinence.    Current pain: 1/10 At worst: 7-8/10 in foot    Past Medical History:   Diagnosis Date     Genital herpes 2015    buttock paresthesia, urine symptoms     H/O colonoscopy 2016    nl     Hypothyroid 8/15    not on meds     Seasonal allergies        Past Medical History reviewed with patient during visit.    Past Surgical History:   Procedure Laterality Date     C ANESTH,REPAIR LO ABD HERNIA NOS      age less than 10 years     clavicular surgery for fx  2013     COLONOSCOPY N/A 3/10/2016    Procedure: COLONOSCOPY;  Surgeon: Frank Ayala MD;  Location:  GI     HERNIA REPAIR  2005    bilat     VASECTOMY  10/2008     Past Surgical History reviewed with patient during visit.    Current Outpatient Medications   Medication     fluticasone (FLONASE) 50 MCG/ACT nasal spray     No current facility-administered medications for this visit.        No Known Allergies    Social History     Socioeconomic History     Marital status:      Spouse name: Not on file     Number of children: 3     Years of  education: Not on file     Highest education level: Not on file   Occupational History     Occupation: , Mathmetician     Employer: [employer]   Social Needs     Financial resource strain: Not on file     Food insecurity:     Worry: Not on file     Inability: Not on file     Transportation needs:     Medical: Not on file     Non-medical: Not on file   Tobacco Use     Smoking status: Never Smoker     Smokeless tobacco: Never Used   Substance and Sexual Activity     Alcohol use: No     Alcohol/week: 0.0 oz     Comment: 0     Drug use: No     Sexual activity: Yes     Partners: Female   Lifestyle     Physical activity:     Days per week: Not on file     Minutes per session: Not on file     Stress: Not on file   Relationships     Social connections:     Talks on phone: Not on file     Gets together: Not on file     Attends Orthodox service: Not on file     Active member of club or organization: Not on file     Attends meetings of clubs or organizations: Not on file     Relationship status: Not on file     Intimate partner violence:     Fear of current or ex partner: Not on file     Emotionally abused: Not on file     Physically abused: Not on file     Forced sexual activity: Not on file   Other Topics Concern      Service No     Blood Transfusions No     Caffeine Concern Not Asked     Occupational Exposure Not Asked     Hobby Hazards Not Asked     Sleep Concern Not Asked     Stress Concern Not Asked     Weight Concern Not Asked     Special Diet Not Asked     Back Care Not Asked     Exercise No     Comment: not scheduled     Bike Helmet Not Asked     Seat Belt Yes     Self-Exams Not Asked     Parent/sibling w/ CABG, MI or angioplasty before 65F 55M? Not Asked   Social History Narrative     Not on file       Family History   Problem Relation Age of Onset     Lipids Father      C.A.D. Paternal Grandmother         heart attack late 60's, no other heart disease in the family     Cancer - colorectal  Paternal Uncle         but no closer fam hx of colon cancer     Diabetes No family hx of      Cerebrovascular Disease No family hx of      Breast Cancer No family hx of      Prostate Cancer No family hx of      Eye Disorder No family hx of         no glaucoma          ROS: 10 point ROS neg other than the symptoms noted above in the HPI.    Vital Signs: There were no vitals taken for this visit.    Examination:  Constitutional:  Alert, well nourished, NAD.  HEENT: Normocephalic, atraumatic.   Pulmonary:  Without shortness of breath, normal effort.   Lymph: no lymphadenopathy to low back or LE.   Integumentary: Skin is free of rashes or lesions.   Cardiovascular:  No pitting edema of BLE.      Neurological:  Awake  Alert  Oriented x 3  Speech clear  Cranial nerves II - XII grossly intact  PERRL  EOMI  Face symmetric  Tongue midline  Motor exam   Hip Flexor:                Right: 5/5  Left:  5/5  Hip Adductor:             Right:  5/5  Left:  5/5  Hip Abductor:             Right:  5/5  Left:  5/5  Gastroc Soleus:        Right:  5/5  Left:  5/5  Tib/Ant:                      Right:  5/5  Left:  5/5  EHL:                          Right:  5/5  Left:  5/5       Sensation normal to bilateral lower extremities.    Reflexes are 2+ in the patellar and Achilles. There is no clonus. Downgoing Babinski.  Musculoskeletal:  Gait: Able to stand from a seated position. Normal non-antalgic, non-myelopathic gait.  Able to heel/toe walk without loss of balance  Lumbar examination reveals no tenderness of the spine or paraspinous muscles.  Hip height is symmetrical. Negative SI joint, sciatic notch or greater trochanteric tenderness to palpation bilaterally.  Straight leg raise causes increase in symptoms on the left.    Imaging:   XR of the lumbar spine from 3/15/2019 was reviewed in the office today. Reveals no acute changes.    Assessment/Plan:   Ever Garnica is a 53 year old male who presents for evaluation of left leg and foot pain  x 2-3 years. Pain is located in posterior left leg and radiates down into foot. Describes the pain as intermittent numbness and tingling in the leg and an intermittent pain in the left foot. He is having numbness in 4th toe. Lumbar XR reviewed in office. Left foot xray with only mild degenerative changes. No weakness on exam. Will obtain lumbar MRI as xray does not explain the ongoing paresthesias and left foot pain and contact him with the results. Patient voiced understanding and agreement.            Jackelyn Whittaker PA-C  [facility]  [address] [address]    Tel [phone]  Pager [phone]

## 2019-03-19 ENCOUNTER — OFFICE VISIT (OUTPATIENT)
Dept: PODIATRY | Facility: CLINIC | Age: 54
End: 2019-03-19
Payer: COMMERCIAL

## 2019-03-19 VITALS
BODY MASS INDEX: 25.73 KG/M2 | DIASTOLIC BLOOD PRESSURE: 68 MMHG | SYSTOLIC BLOOD PRESSURE: 118 MMHG | HEIGHT: 72 IN | HEART RATE: 63 BPM | WEIGHT: 190 LBS

## 2019-03-19 DIAGNOSIS — D36.10 NEUROMA: Primary | ICD-10-CM

## 2019-03-19 PROCEDURE — 99243 OFF/OP CNSLTJ NEW/EST LOW 30: CPT | Performed by: PODIATRIST

## 2019-03-19 ASSESSMENT — MIFFLIN-ST. JEOR: SCORE: 1744.83

## 2019-03-19 NOTE — LETTER
3/19/2019         RE: Ever Garnica  [address]  [place] [address]        Dear Colleague,    Thank you for referring your patient, Ever Garnica, to the [facility]. Please see a copy of my visit note below.    PATIENT HISTORY:  Ever Garnica is a 53 year old male who presents to clinic for L foot numbness into 4th toe.  Occasional pain in ball of foot.  1 year duration.  No injury.  Worse with pressure/tight shoegear, better with rest.  0-8/10 pain.      I was requested to see this patient for this issue by Dr Fabiana Cabral.    Review of Systems:  Patient denies fever, chills, rash, wound, stiffness, limping, numbness, weakness, heart burn, blood in stool, chest pain with activity, calf pain when walking, shortness of breath with activity, chronic cough, easy bleeding/bruising, swelling of ankles, excessive thirst, fatigue, depression, anxiety.       PAST MEDICAL HISTORY:   Past Medical History:   Diagnosis Date     Genital herpes 2015    buttock paresthesia, urine symptoms     H/O colonoscopy 2016    nl     Hypothyroid 8/15    not on meds     Seasonal allergies         PAST SURGICAL HISTORY:   Past Surgical History:   Procedure Laterality Date     C ANESTH,REPAIR LO ABD HERNIA NOS      age less than 10 years     clavicular surgery for fx  2013     COLONOSCOPY N/A 3/10/2016    Procedure: COLONOSCOPY;  Surgeon: Frank Ayala MD;  Location:  GI     HERNIA REPAIR  2005    bilat     VASECTOMY  10/2008        MEDICATIONS: No current outpatient medications on file.     ALLERGIES:  No Known Allergies     SOCIAL HISTORY:   Social History     Socioeconomic History     Marital status:      Spouse name: Not on file     Number of children: 3     Years of education: Not on file     Highest education level: Not on file   Occupational History     Occupation: , Mathmetician     Employer: [employer]   Social Needs     Financial resource strain: Not on file     Food  insecurity:     Worry: Not on file     Inability: Not on file     Transportation needs:     Medical: Not on file     Non-medical: Not on file   Tobacco Use     Smoking status: Never Smoker     Smokeless tobacco: Never Used   Substance and Sexual Activity     Alcohol use: No     Alcohol/week: 0.0 oz     Comment: 0     Drug use: No     Sexual activity: Yes     Partners: Female   Lifestyle     Physical activity:     Days per week: Not on file     Minutes per session: Not on file     Stress: Not on file   Relationships     Social connections:     Talks on phone: Not on file     Gets together: Not on file     Attends Gnosticism service: Not on file     Active member of club or organization: Not on file     Attends meetings of clubs or organizations: Not on file     Relationship status: Not on file     Intimate partner violence:     Fear of current or ex partner: Not on file     Emotionally abused: Not on file     Physically abused: Not on file     Forced sexual activity: Not on file   Other Topics Concern      Service No     Blood Transfusions No     Caffeine Concern Not Asked     Occupational Exposure Not Asked     Hobby Hazards Not Asked     Sleep Concern Not Asked     Stress Concern Not Asked     Weight Concern Not Asked     Special Diet Not Asked     Back Care Not Asked     Exercise No     Comment: not scheduled     Bike Helmet Not Asked     Seat Belt Yes     Self-Exams Not Asked     Parent/sibling w/ CABG, MI or angioplasty before 65F 55M? Not Asked   Social History Narrative     Not on file        FAMILY HISTORY:   Family History   Problem Relation Age of Onset     Lipids Father      C.A.D. Paternal Grandmother         heart attack late 60's, no other heart disease in the family     Cancer - colorectal Paternal Uncle         but no closer fam hx of colon cancer     Diabetes No family hx of      Cerebrovascular Disease No family hx of      Breast Cancer No family hx of      Prostate Cancer No family hx of       Eye Disorder No family hx of         no glaucoma        EXAM:Vitals: /68   Pulse 63   Ht 1.829 m (6')   Wt 86.2 kg (190 lb)   BMI 25.77 kg/m     BMI= Body mass index is 25.77 kg/m .    General appearance: Patient is alert and fully cooperative with history & exam.  No sign of distress is noted during the visit.     Psychiatric: Affect is pleasant & appropriate.  Patient appears motivated to improve health.     Respiratory: Breathing is regular & unlabored while sitting.     HEENT: Hearing is intact to spoken word.  Speech is clear.  No gross evidence of visual impairment that would impact ambulation.     Dermatologic: Skin is intact to L foot without significant lesions, rash or abrasion.  No paronychia or evidence of soft tissue infection is noted.     Vascular: DP & PT pulses are intact & regular on the L.  No significant edema or varicosities noted.  CFT and skin temperature are normal to both lower extremities.     Neurologic: Lower extremity sensation is intact to light touch.  No evidence of weakness or contracture in the lower extremities.  Pt reports some numbness into L 4th toe.     Musculoskeletal: L 3rd interspace with mild pain to palpation.  No click noted.  Patient is ambulatory without assistive device or brace.  No gross ankle deformity noted.  No foot or ankle joint effusion is noted.    Prior XRs of L foot reviewed with pt.    No acute findings.  Scattered degenerative changes.     ASSESSMENT: L foot neuroma     PLAN:  Reviewed patient's chart in epic.  Discussed condition and treatment options including pros and cons.    Treatment options for Joy's neuroma were discussed.  Non-operative treatment would include wide shoes, orthotics /w metatarsal pads, injection and avoidance of activities that cause pain.  Patient is aware of the progressive nature of this problem and I would anticipate further nerve symptoms over time.  Current symptoms will likely progress and limitations of  activities and shoe intolerance may escalate over the years.  Non-operative treatments can be quite effective but that might depend on how much damage has occurred to the nerve.  Narrow fitting shoes will not likely be tolerated.  Surgery discussed -- this is a last resort.     Pt will try otc inserts with metatarsal pads, wide supportive shoes, antiinflammatory measures.  F/u 1-2 months prn.       Rafael Jones DPM, FACFAS    Weight management plan: Patient was referred to their PCP to discuss a diet and exercise plan.      Again, thank you for allowing me to participate in the care of your patient.        Sincerely,        Rafael Jones DPM

## 2019-03-19 NOTE — PROGRESS NOTES
PATIENT HISTORY:  Ever Garnica is a 53 year old male who presents to clinic for L foot numbness into 4th toe.  Occasional pain in ball of foot.  1 year duration.  No injury.  Worse with pressure/tight shoegear, better with rest.  0-8/10 pain.      I was requested to see this patient for this issue by Dr Fabiana Cabral.    Review of Systems:  Patient denies fever, chills, rash, wound, stiffness, limping, numbness, weakness, heart burn, blood in stool, chest pain with activity, calf pain when walking, shortness of breath with activity, chronic cough, easy bleeding/bruising, swelling of ankles, excessive thirst, fatigue, depression, anxiety.       PAST MEDICAL HISTORY:   Past Medical History:   Diagnosis Date     Genital herpes 2015    buttock paresthesia, urine symptoms     H/O colonoscopy 2016    nl     Hypothyroid 8/15    not on meds     Seasonal allergies         PAST SURGICAL HISTORY:   Past Surgical History:   Procedure Laterality Date     C ANESTH,REPAIR LO ABD HERNIA NOS      age less than 10 years     clavicular surgery for fx  2013     COLONOSCOPY N/A 3/10/2016    Procedure: COLONOSCOPY;  Surgeon: Frank Ayala MD;  Location:  GI     HERNIA REPAIR  2005    bilat     VASECTOMY  10/2008        MEDICATIONS: No current outpatient medications on file.     ALLERGIES:  No Known Allergies     SOCIAL HISTORY:   Social History     Socioeconomic History     Marital status:      Spouse name: Not on file     Number of children: 3     Years of education: Not on file     Highest education level: Not on file   Occupational History     Occupation: , Mathmetician     Employer: [employer]   Social Needs     Financial resource strain: Not on file     Food insecurity:     Worry: Not on file     Inability: Not on file     Transportation needs:     Medical: Not on file     Non-medical: Not on file   Tobacco Use     Smoking status: Never Smoker     Smokeless tobacco: Never Used   Substance and Sexual Activity      Alcohol use: No     Alcohol/week: 0.0 oz     Comment: 0     Drug use: No     Sexual activity: Yes     Partners: Female   Lifestyle     Physical activity:     Days per week: Not on file     Minutes per session: Not on file     Stress: Not on file   Relationships     Social connections:     Talks on phone: Not on file     Gets together: Not on file     Attends Jew service: Not on file     Active member of club or organization: Not on file     Attends meetings of clubs or organizations: Not on file     Relationship status: Not on file     Intimate partner violence:     Fear of current or ex partner: Not on file     Emotionally abused: Not on file     Physically abused: Not on file     Forced sexual activity: Not on file   Other Topics Concern      Service No     Blood Transfusions No     Caffeine Concern Not Asked     Occupational Exposure Not Asked     Hobby Hazards Not Asked     Sleep Concern Not Asked     Stress Concern Not Asked     Weight Concern Not Asked     Special Diet Not Asked     Back Care Not Asked     Exercise No     Comment: not scheduled     Bike Helmet Not Asked     Seat Belt Yes     Self-Exams Not Asked     Parent/sibling w/ CABG, MI or angioplasty before 65F 55M? Not Asked   Social History Narrative     Not on file        FAMILY HISTORY:   Family History   Problem Relation Age of Onset     Lipids Father      C.A.D. Paternal Grandmother         heart attack late 60's, no other heart disease in the family     Cancer - colorectal Paternal Uncle         but no closer fam hx of colon cancer     Diabetes No family hx of      Cerebrovascular Disease No family hx of      Breast Cancer No family hx of      Prostate Cancer No family hx of      Eye Disorder No family hx of         no glaucoma        EXAM:Vitals: /68   Pulse 63   Ht 1.829 m (6')   Wt 86.2 kg (190 lb)   BMI 25.77 kg/m    BMI= Body mass index is 25.77 kg/m .    General appearance: Patient is alert and fully cooperative  with history & exam.  No sign of distress is noted during the visit.     Psychiatric: Affect is pleasant & appropriate.  Patient appears motivated to improve health.     Respiratory: Breathing is regular & unlabored while sitting.     HEENT: Hearing is intact to spoken word.  Speech is clear.  No gross evidence of visual impairment that would impact ambulation.     Dermatologic: Skin is intact to L foot without significant lesions, rash or abrasion.  No paronychia or evidence of soft tissue infection is noted.     Vascular: DP & PT pulses are intact & regular on the L.  No significant edema or varicosities noted.  CFT and skin temperature are normal to both lower extremities.     Neurologic: Lower extremity sensation is intact to light touch.  No evidence of weakness or contracture in the lower extremities.  Pt reports some numbness into L 4th toe.     Musculoskeletal: L 3rd interspace with mild pain to palpation.  No click noted.  Patient is ambulatory without assistive device or brace.  No gross ankle deformity noted.  No foot or ankle joint effusion is noted.    Prior XRs of L foot reviewed with pt.    No acute findings.  Scattered degenerative changes.     ASSESSMENT: L foot neuroma     PLAN:  Reviewed patient's chart in epic.  Discussed condition and treatment options including pros and cons.    Treatment options for Joy's neuroma were discussed.  Non-operative treatment would include wide shoes, orthotics /w metatarsal pads, injection and avoidance of activities that cause pain.  Patient is aware of the progressive nature of this problem and I would anticipate further nerve symptoms over time.  Current symptoms will likely progress and limitations of activities and shoe intolerance may escalate over the years.  Non-operative treatments can be quite effective but that might depend on how much damage has occurred to the nerve.  Narrow fitting shoes will not likely be tolerated.  Surgery discussed -- this is a  last resort.     Pt will try otc inserts with metatarsal pads, wide supportive shoes, antiinflammatory measures.  F/u 1-2 months prn.       Rafael Jones DPM, FACFAS    Weight management plan: Patient was referred to their PCP to discuss a diet and exercise plan.     acute mild/Malnutrition

## 2019-03-19 NOTE — PATIENT INSTRUCTIONS
Try orthotics with metatarsal pads from [employer]    Thank you for choosing [facility]!    Follow up as needed    DR. MCCALL'S CLINIC LOCATIONS     MONDAY  [place] TUESDAY & FRIDAY AM  [place]   [address] [facility]   [address]   [place] [address] [place] [address]   [phone]  [phone]  [phone]       WEDNESDAY  [facility] [phone]   [address] [place] [address] APPOINTMENTS: [phone]   [place] [address] BILLING QUESTIONS: [phone]   [phone]         JOY'S NEUROMA     What is a Joy's Neuroma?     Joy's neuroma is an enlargement or thickening of a nerve in the foot. It is also sometimes referred to as an intermetatarsal neuroma, interdigital neuroma, Joy's metatarsalgia (pain in the metatarsal head area), brent-neural fibrosis (scar tissue around a nerve) or entrapment neuropathy (abnormal nerve due to compression). A Joy's neuroma most commonly occurs in the third interspace between the third and fourth toes, followed by the second interspace between the second and third toes. Joy's neuromas have also occurred in the fourth and first interspaces, but these are rare. If you have a Joy's neuroma, there is a 15% chance it will occur bilaterally (on both feet). Joy's neuromas occur most commonly in women who are between 30 to 50 years old. The reason they are more common in women is thought to be due to the shoes women wear.   What Causes a Joy's Neuroma?   A Joy's neuroma is thought to be caused by trauma to the nerve, but scientists are still not sure about the exact cause of the trauma. The trauma may be caused by the metatarsal heads, the deep transverse intermetatarsal ligament (holds the metatarsal heads together) or an intermetatarsal bursa (fluid-filled sac). All of these structures can cause compression/trauma on the nerve which initially  "causes swelling and injury in the nerve. Over time if the compression/trauma continues, the nerve repairs itself with very fibrous tissue that leads to enlargement and thickening of the nerve. Other causes of trauma to the nerve may include; overpronation (foot rolls inward), hypermobility (too much motion), cavo varus (high arch foot) and excessive dorsiflexion (toes bend upward) of the toes. These biomechanical (how the foot moves) factors may cause trauma to the nerve with every step. If the nerve becomes irritated and enlarged then it takes up more space and gets even more compressed and irritated. It becomes a vicious cycle.   Signs & Symptoms of a Joy's Neuroma    - Pain (sharp, stabbing, throbbing, shooting)    - Numbness    - Tingling or \"pins & needles\"    - Burning    - Cramping    - A feeling that you are stepping on something or that something is in your shoe    - Initially the symptoms may happen once in a while, but as the condition gets worse, the symptoms may happen all of the time    - It usually feels better by taking off your shoe and massaging your foot     Diagnosis/Tests (Exam) for a Joy's Neuroma   Your podiatrist (foot doctor) will ask many questions about your signs and symptoms and will perform a physical exam. Some of the exams may include a web space compression test. This is done by squeezing the metatarsals together with one hand and using the thumb and index finger of the other hand to compress the affected web space to reproduce the pain/symptoms. A palpable click (Katalina's click) is usually present. This test may also cause pain to shoot into the toes and that is called a Tinel's sign. Pool's test involves squeezing the metatarsals together and moving the toes up and down for 30 seconds. This will usually cause pain or it will bring on your other symptoms. Kinney's sign is positive when you stand and the affected toes spread apart. A Joy's neuroma is usually diagnosed " based on the history and physical exam findings, but sometimes other tests such as an x-ray, ultrasound or an MRI are needed.   Treatment of a Joy's Neuroma    1.  Footwear changes: Wear shoes that are wide and deep in the toe box so they do not put pressure on your toes and metatarsals. Avoid wearing high heels because they cause increased pressure on the ball of your foot (forefoot).     2.  Metatarsal pads: These help to lift and separate the metatarsal heads to take pressure off of the nerve. They are placed just behind where you feel the pain, not on top of the painful spot.    3.  Activity modification: For example, you may try swimming instead of running until your symptoms go away.    4.  Taping    5.  Icing    6.  NSAIDs (anti-inflammatories): aleve, ibuprofen, etc.    7.  Arch supports or orthotics: These help to control some of the abnormal motion in your feet. The abnormal motion can lead to extra torque and pressure on the nerve.    8.  Physical Therapy   9.  Cortisone injection: Helps to decrease the size of the irritated, enlarged nerve.    10.  Sclerosing Alcohol injection: Helps to destroy the nerve chemically. Does cause permanent numbness.   11.  Surgery: If conservative treatment does not help surgery may be needed. Surgery may involve cutting out the nerve or cutting the intermetatarsal ligament. Studies have shown surgery has an 80-85% success rate.  This will result in numbness.     Prevention of a Joy's Neuroma    -Avoid wearing narrow, pointed toe shoes    -Avoid wearing high heel shoes             BODY WEIGHT AND YOUR FEET  The following information is included in the after visit summary for all patients. Body weight can be a sensitive issue to discuss in clinic, but we think the following information is very important. Although we focus on the feet and ankles, we do support the overall health of our patients.     Many things can cause foot and ankle problems. Foot structure, activity  level, foot mechanics and injuries are common causes of pain. One very important issue that often goes unmentioned, is body weight. Extra weight can cause increased stress on muscles, ligaments, bones and tendons. Sometimes just a few extra pounds is all it takes to put one over her/his threshold. Without reducing that stress, it can be difficult to alleviate pain. As Foot & Ankle specialists, our job is addressing the lower extremity problem and possible causes. Regarding extra body weight, we encourage patients to discuss diet and weight management plans with their primary care doctors. It is this team approach that gives you the best opportunity for pain relief and getting you back on your feet.      [facility] has a Comprehensive Weight Management Program. This program includes counseling, education, non-surgical and surgical approaches to weight loss. If you are interested in learning more either talk to you primary care provider or call [phone].

## 2019-03-21 ENCOUNTER — HOSPITAL ENCOUNTER (OUTPATIENT)
Dept: MRI IMAGING | Facility: CLINIC | Age: 54
Discharge: HOME | End: 2019-03-21
Attending: PHYSICIAN ASSISTANT | Admitting: PHYSICIAN ASSISTANT
Payer: COMMERCIAL

## 2019-03-21 DIAGNOSIS — R20.2 PARESTHESIAS: ICD-10-CM

## 2019-03-21 PROCEDURE — 72148 MRI LUMBAR SPINE W/O DYE: CPT

## 2019-03-22 ENCOUNTER — TELEPHONE (OUTPATIENT)
Dept: NEUROSURGERY | Facility: CLINIC | Age: 54
End: 2019-03-22

## 2019-03-22 NOTE — TELEPHONE ENCOUNTER
M for patient to review MRI results. MRI normal with no signs of cord compression or nerve impingement correlating with LLE symptoms. Would recommend PT and if persistent leg pain LLE EMG.

## 2019-03-26 NOTE — TELEPHONE ENCOUNTER
Patient called back and LM on RN line to review MRI results.       Per Jackelyn Whittaker PA-C note:    MRI normal with no signs of cord compression or nerve impingement correlating with LLE symptoms. Would recommend PT and if persistent leg pain LLE EMG.       Returned call to patient. Left detailed message with above results and recommendations. Advised patient to call back if any questions and or would like PT order placed.

## 2019-03-26 NOTE — TELEPHONE ENCOUNTER
Patient called back. Reviewed results and recommendations from previous note from Jackelyn Whittaker PA-C. Patient verbalized understanding. Patient would like to hold off on PT as he has been working with his  on posture and this is helping his leg pain. He will call back if he wants PT order placed.

## 2019-10-03 ENCOUNTER — ANCILLARY PROCEDURE (OUTPATIENT)
Dept: GENERAL RADIOLOGY | Facility: CLINIC | Age: 54
End: 2019-10-03
Attending: INTERNAL MEDICINE
Payer: COMMERCIAL

## 2019-10-03 ENCOUNTER — OFFICE VISIT (OUTPATIENT)
Dept: FAMILY MEDICINE | Facility: CLINIC | Age: 54
End: 2019-10-03
Payer: COMMERCIAL

## 2019-10-03 VITALS
HEART RATE: 67 BPM | OXYGEN SATURATION: 96 % | BODY MASS INDEX: 26.55 KG/M2 | TEMPERATURE: 98 F | HEIGHT: 72 IN | WEIGHT: 196 LBS | DIASTOLIC BLOOD PRESSURE: 71 MMHG | SYSTOLIC BLOOD PRESSURE: 110 MMHG

## 2019-10-03 DIAGNOSIS — M25.551 HIP PAIN, RIGHT: Primary | ICD-10-CM

## 2019-10-03 DIAGNOSIS — M25.551 HIP PAIN, RIGHT: ICD-10-CM

## 2019-10-03 PROCEDURE — 99213 OFFICE O/P EST LOW 20 MIN: CPT | Performed by: INTERNAL MEDICINE

## 2019-10-03 PROCEDURE — 73502 X-RAY EXAM HIP UNI 2-3 VIEWS: CPT

## 2019-10-03 ASSESSMENT — MIFFLIN-ST. JEOR: SCORE: 1767.05

## 2019-10-03 NOTE — PROGRESS NOTES
Subjective     Ever Garnica is a 54 year old male who presents to clinic today for the following health issues:    HPI   Musculoskeletal problem/pain      Duration: Has been going on for quite a while now, for at least couple months, was aggravated more than a year than resolved, and then symptoms recurred    Description  Location: hip, feels pain inside hip area,    Intensity:  moderate    Accompanying signs and symptoms: none, no radiculopathy symptoms    History  Previous similar problem: YES- off and on  Previous evaluation:  none    Precipitating or alleviating factors:  Trauma or overuse: YES,   Aggravating factors include: exercise , does biking and [employer]]    Therapies tried and outcome: ibuprofen        Patient Active Problem List   Diagnosis     Seasonal allergies     Hypothyroidism     Genital herpes     Past Surgical History:   Procedure Laterality Date     C ANESTH,REPAIR LO ABD HERNIA NOS      age less than 10 years     clavicular surgery for fx  2013     COLONOSCOPY N/A 3/10/2016    Procedure: COLONOSCOPY;  Surgeon: Frank Ayala MD;  Location:  GI     HERNIA REPAIR  2005    bilat     VASECTOMY  10/2008       Social History     Tobacco Use     Smoking status: Never Smoker     Smokeless tobacco: Never Used   Substance Use Topics     Alcohol use: No     Alcohol/week: 0.0 standard drinks     Comment: 0     Family History   Problem Relation Age of Onset     Lipids Father      C.A.D. Paternal Grandmother         heart attack late 60's, no other heart disease in the family     Cancer - colorectal Paternal Uncle         but no closer fam hx of colon cancer     Diabetes No family hx of      Cerebrovascular Disease No family hx of      Breast Cancer No family hx of      Prostate Cancer No family hx of      Eye Disorder No family hx of         no glaucoma         No current outpatient medications on file.     No Known Allergies  Recent Labs   Lab Test 10/10/17  1136 02/09/16  1701   08/21/14  1134   * 186*  --  153*   HDL 74 71  --  80   TRIG 57 73  --  78   ALT 18 15  --  15   CR 1.14 1.06  --  1.15   GFRESTIMATED 67 74  --  68   GFRESTBLACK 82 89  --  82   POTASSIUM 4.5 4.2  --  3.8   TSH 5.20* 6.25*   < > 6.40*    < > = values in this interval not displayed.      BP Readings from Last 3 Encounters:   10/03/19 110/71   03/19/19 118/68   03/18/19 118/68    Wt Readings from Last 3 Encounters:   10/03/19 88.9 kg (196 lb)   03/19/19 86.2 kg (190 lb)   03/18/19 86.2 kg (190 lb)                    Reviewed and updated as needed this visit by Provider         Review of Systems   ROS COMP: Constitutional, HEENT, cardiovascular, pulmonary, gi and  systems are negative, except as otherwise noted.      Objective    /71 (BP Location: Right arm, Patient Position: Sitting, Cuff Size: Adult Regular)   Pulse 67   Temp 98  F (36.7  C) (Tympanic)   Ht 1.829 m (6')   Wt 88.9 kg (196 lb)   SpO2 96%   BMI 26.58 kg/m    Body mass index is 26.58 kg/m .  Physical Exam   GENERAL: healthy, alert and no distress  CV: regular rate and rhythm, normal S1 S2, no S3 or S4, no murmur, click or rub, no peripheral edema and peripheral pulses strong  ABDOMEN: soft, nontender, no hepatosplenomegaly, no masses and bowel sounds normal  MS: no gross musculoskeletal defects noted, no edema  MS: Right hip, FROM, no limitation of ROM, no tenderness over trochanteric bursa. No groin masses or LN  NEURO: Normal strength and tone, mentation intact and speech normal  BACK: no CVA tenderness, no paralumbar tenderness    Diagnostic Test Results:  Labs reviewed in Epic        Assessment & Plan   Problem List Items Addressed This Visit     None      Visit Diagnoses     Hip pain, right    -  Primary    Relevant Orders    XR Hip Right 2-3 Views (Completed)    [facility]           Prn motrin  Avoid excessive strenuous activities  Will refer to sports medicine if symptoms do not  improve        Return in about 4 weeks (around 10/31/2019).    Kade Haro MD  [facility]

## 2019-10-04 NOTE — RESULT ENCOUNTER NOTE
Ever,  X-ray of your hip shows mild arthritis does not explain the pain you are experiencing.  Please follow recommendations discussed in the clinic and physical therapy.  Dr. Haro

## 2019-10-07 ENCOUNTER — THERAPY VISIT (OUTPATIENT)
Dept: PHYSICAL THERAPY | Facility: CLINIC | Age: 54
End: 2019-10-07
Attending: INTERNAL MEDICINE
Payer: COMMERCIAL

## 2019-10-07 DIAGNOSIS — M25.551 HIP PAIN, RIGHT: ICD-10-CM

## 2019-10-07 PROCEDURE — 97161 PT EVAL LOW COMPLEX 20 MIN: CPT | Mod: GP | Performed by: PHYSICAL THERAPIST

## 2019-10-07 PROCEDURE — 97530 THERAPEUTIC ACTIVITIES: CPT | Mod: GP | Performed by: PHYSICAL THERAPIST

## 2019-10-07 PROCEDURE — 97110 THERAPEUTIC EXERCISES: CPT | Mod: GP | Performed by: PHYSICAL THERAPIST

## 2019-10-07 ASSESSMENT — ACTIVITIES OF DAILY LIVING (ADL)
PUTTING_ON_SOCKS_AND_SHOES: NO DIFFICULTY AT ALL
STANDING_FOR_15_MINUTES: NO DIFFICULTY AT ALL
HOS_ADL_HIGHEST_POTENTIAL_SCORE: 60
GOING_DOWN_1_FLIGHT_OF_STAIRS: NO DIFFICULTY AT ALL
ROLLING_OVER_IN_BED: SLIGHT DIFFICULTY
TWISTING/PIVOTING_ON_INVOLVED_LEG: NO DIFFICULTY AT ALL
LIGHT_TO_MODERATE_WORK: NO DIFFICULTY AT ALL
HOW_WOULD_YOU_RATE_YOUR_CURRENT_LEVEL_OF_FUNCTION_DURING_YOUR_USUAL_ACTIVITIES_OF_DAILY_LIVING_FROM_0_TO_100_WITH_100_BEING_YOUR_LEVEL_OF_FUNCTION_PRIOR_TO_YOUR_HIP_PROBLEM_AND_0_BEING_THE_INABILITY_TO_PERFORM_ANY_OF_YOUR_USUAL_DAILY_ACTIVITIES?: 95
HOS_ADL_ITEM_SCORE_TOTAL: 56
SITTING_FOR_15_MINUTES: MODERATE DIFFICULTY
HOS_ADL_SCORE(%): 93.33
WALKING_UP_STEEP_HILLS: NO DIFFICULTY AT ALL
GETTING_INTO_AND_OUT_OF_AN_AVERAGE_CAR: NO DIFFICULTY AT ALL
WALKING_INITIALLY: NO DIFFICULTY AT ALL
STEPPING_UP_AND_DOWN_CURBS: NO DIFFICULTY AT ALL
GOING_UP_1_FLIGHT_OF_STAIRS: NO DIFFICULTY AT ALL
WALKING_DOWN_STEEP_HILLS: NO DIFFICULTY AT ALL
WALKING_15_MINUTES_OR_GREATER: NO DIFFICULTY AT ALL
RECREATIONAL_ACTIVITIES: EXTREME DIFFICULTY
HOS_ADL_COUNT: 15
WALKING_APPROXIMATELY_10_MINUTES: NO DIFFICULTY AT ALL
GETTING_INTO_AND_OUT_OF_A_BATHTUB: NO DIFFICULTY AT ALL

## 2019-10-07 NOTE — PROGRESS NOTES
[facility] Initial Evaluation  Subjective:  The history is provided by the patient. No  was used.   Type of problem:  Right hip   Condition occurred with:  Insidious onset (past 3-6  months). This is a new condition   Problem details: Pt reports right hip pain possiblly due to karate.  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 Significant pain with sitting and when sleeping.  Increased aching at times after karate, described as R anterior hip, deep into the joint.    xrays right hip show mild OA    Biking and walking decrease the pain and aching. .   Patient reports pain:  Joint and anterior.  Associated symptoms:  Loss of motion/stiffness, catching and loss of strength. Symptoms are exacerbated by certain positions, sitting and lying on extremity and relieved by rest, activity/movement and NSAID's.                      Objective:  System                                           Hip Evaluation  HIP AROM:    Flexion: Left:   Right:  Min, end range discomfort      Abduction: Left:    Right:  Wnl      Internal Rotation: Left:   Right: min decrease, +  External Rotation: Left:   Right: wnl      Hip PROM:            Internal Rotation: Left:   Right: end range +                Hip Strength:    Flexion:   Left: 5/5   Pain:  Right: 5/5   Pain:                      Abduction:  Right: 5-/5     Pain:strong/pain free  Adduction:  Left: 5/5    Pain:Right: 5/5   Pain:  Internal Rotation:  Left: 5-/5    Pain:Right: 5-/5   Pain:    Knee Flexion:  Left: 5/5   Pain:Right: 5/5   Pain:  Knee Extension:  Left: 5/5   Pain:Right: 5/5    Pain:        Hip Special Testing:       Right hip positive for the following special tests:  FabreRight hip negative for the following special tests:  SLR or Chris's    Hip Palpation:        Right hip tenderness not present at:  Greater Trachanter or IT Band  Functional Testing:          Quad:     Single leg squat:   Left:    Mild loss of control  Right:   Moderate loss of control    Bilateral leg squat:  Normal control                  General     ROS    Assessment/Plan:    Patient is a 54 year old male with right side hip complaints.    Patient has the following significant findings with corresponding treatment plan.                      Therapy Evaluation Codes:   1) History comprised of:   Personal factors that impact the plan of care:      None.    Comorbidity factors that impact the plan of care are:      None.     Medications impacting care: Anti-inflammatory.  2) Examination of Body Systems comprised of:   Body structures and functions that impact the plan of care:      Hip.   Activity limitations that impact the plan of care are:      Sitting and Sleeping.  3) Clinical presentation characteristics are:   Stable/Uncomplicated.  4) Decision-Making    Low complexity using standardized patient assessment instrument and/or measureable assessment of functional outcome.  Cumulative Therapy Evaluation is: Low complexity.    Previous and current functional limitations:  (See Goal Flow Sheet for this information)    Short term and Long term goals: (See Goal Flow Sheet for this information)     Communication ability:  Patient appears to be able to clearly communicate and understand verbal and written communication and follow directions correctly.  Treatment Explanation - The following has been discussed with the patient:   RX ordered/plan of care  Anticipated outcomes  Possible risks and side effects  This patient would benefit from PT intervention to resume normal activities.   Rehab potential is good.    Frequency:  1 X week, once daily  Duration:  for 6 weeks  Discharge Plan:  Achieve all LTG.  Independent in home treatment program.  Reach maximal therapeutic benefit.    Please refer to the daily flowsheet for treatment today, total treatment time and time spent performing 1:1 timed codes.

## 2019-10-08 NOTE — PROGRESS NOTES
[facility] Initial Evaluation  Subjective:       Health conditions: Nortons neuroma.  Medical allergies: None.  Surgeries include:  Other (Hernia 10+ years of age, vasectomy 10 years ago).  Current medications:  Pain medication.   Primary job tasks include:  Computer work and prolonged sitting.           Occupation: .                           Objective:  System    Physical Exam    General     ROS    Assessment/Plan:

## 2019-10-22 ENCOUNTER — THERAPY VISIT (OUTPATIENT)
Dept: PHYSICAL THERAPY | Facility: CLINIC | Age: 54
End: 2019-10-22
Payer: COMMERCIAL

## 2019-10-22 DIAGNOSIS — M25.551 HIP PAIN, RIGHT: Primary | ICD-10-CM

## 2019-10-22 PROCEDURE — 97530 THERAPEUTIC ACTIVITIES: CPT | Mod: GP | Performed by: PHYSICAL THERAPIST

## 2019-10-22 PROCEDURE — 97110 THERAPEUTIC EXERCISES: CPT | Mod: GP | Performed by: PHYSICAL THERAPIST

## 2019-10-22 PROCEDURE — 97112 NEUROMUSCULAR REEDUCATION: CPT | Mod: GP | Performed by: PHYSICAL THERAPIST

## 2019-10-22 NOTE — PROGRESS NOTES
Subjective:  HPI                    Objective:  System    Physical Exam    General     ROS    Assessment/Plan:    DISCHARGE REPORT    Progress reporting period is from 10-7-19 to 10-22-19.       SUBJECTIVE  Subjective changes noted by patient:   Pt. has made good progress in a short 2 visit stint of PT. He has not had any c/o R hip pain the past week with any daily activity including sleeping. He has not attempted karate as of yet and plans to wait a few more weeks before doing so. Pt. still presents with hip weakness but plans to address this with an ongoing HEP. Pt. will return to PT if he continues to have pain upon resuming karate in a few weeks. Otherwise he will continue his HEP with no further PT visits planned.      Current pain level is  0/10.     Previous pain level was  3/10  .   Changes in function:  Yes (See Goal flowsheet attached for changes in current functional level)  Adverse reaction to treatment or activity: None    OBJECTIVE  Changes noted in objective findings:  Strength R hip flex 4-/5; abd 4-/5; ext 4/5. PROM R ER 50; L ER 65.     ASSESSMENT/PLAN  Updated problem list and treatment plan: Diagnosis 1:  R hip pain  Pain -  self management and education  Decreased ROM/flexibility - manual therapy and therapeutic exercise  Decreased strength - therapeutic exercise and therapeutic activities  Impaired muscle performance - neuro re-education  Decreased function - therapeutic activities  STG/LTGs have been met or progress has been made towards goals:  Yes (See Goal flow sheet completed today.)  Assessment of Progress: The patient has met all of their long term goals.  Self Management Plans:  Patient is independent in a home treatment program.  Patient is independent in self management of symptoms.  I have re-evaluated this patient and find that the nature, scope, duration and intensity of the therapy is appropriate for the medical condition of the patient.  Ever continues to require the following  intervention to meet STG and LTG's:  PT intervention is no longer required to meet STG/LTG.    Recommendations:  This patient is ready to be discharged from therapy and continue their home treatment program.    Please refer to the daily flowsheet for treatment today, total treatment time and time spent performing 1:1 timed codes.

## 2019-11-12 ENCOUNTER — OFFICE VISIT (OUTPATIENT)
Dept: FAMILY MEDICINE | Facility: CLINIC | Age: 54
End: 2019-11-12
Payer: COMMERCIAL

## 2019-11-12 VITALS
OXYGEN SATURATION: 98 % | HEART RATE: 58 BPM | SYSTOLIC BLOOD PRESSURE: 116 MMHG | DIASTOLIC BLOOD PRESSURE: 76 MMHG | WEIGHT: 195 LBS | TEMPERATURE: 98 F | BODY MASS INDEX: 26.41 KG/M2 | HEIGHT: 72 IN

## 2019-11-12 DIAGNOSIS — J30.2 SEASONAL ALLERGIES: ICD-10-CM

## 2019-11-12 DIAGNOSIS — A60.00 HERPES SIMPLEX INFECTION OF GENITOURINARY SYSTEM: ICD-10-CM

## 2019-11-12 DIAGNOSIS — Z00.00 ROUTINE GENERAL MEDICAL EXAMINATION AT A HEALTH CARE FACILITY: Primary | ICD-10-CM

## 2019-11-12 DIAGNOSIS — E03.9 HYPOTHYROIDISM, UNSPECIFIED TYPE: ICD-10-CM

## 2019-11-12 LAB
ALBUMIN SERPL-MCNC: 3.8 G/DL (ref 3.4–5)
ALP SERPL-CCNC: 73 U/L (ref 40–150)
ALT SERPL W P-5'-P-CCNC: 21 U/L (ref 0–70)
ANION GAP SERPL CALCULATED.3IONS-SCNC: 9 MMOL/L (ref 3–14)
AST SERPL W P-5'-P-CCNC: 26 U/L (ref 0–45)
BILIRUB SERPL-MCNC: 0.4 MG/DL (ref 0.2–1.3)
BUN SERPL-MCNC: 20 MG/DL (ref 7–30)
CALCIUM SERPL-MCNC: 8.5 MG/DL (ref 8.5–10.1)
CHLORIDE SERPL-SCNC: 108 MMOL/L (ref 94–109)
CHOLEST SERPL-MCNC: 248 MG/DL
CO2 SERPL-SCNC: 24 MMOL/L (ref 20–32)
CREAT SERPL-MCNC: 1.08 MG/DL (ref 0.66–1.25)
ERYTHROCYTE [DISTWIDTH] IN BLOOD BY AUTOMATED COUNT: 13.9 % (ref 10–15)
GFR SERPL CREATININE-BSD FRML MDRD: 77 ML/MIN/{1.73_M2}
GLUCOSE SERPL-MCNC: 86 MG/DL (ref 70–99)
HCT VFR BLD AUTO: 44.5 % (ref 40–53)
HDLC SERPL-MCNC: 68 MG/DL
HGB BLD-MCNC: 15.3 G/DL (ref 13.3–17.7)
LDLC SERPL CALC-MCNC: 164 MG/DL
MCH RBC QN AUTO: 30.8 PG (ref 26.5–33)
MCHC RBC AUTO-ENTMCNC: 34.4 G/DL (ref 31.5–36.5)
MCV RBC AUTO: 90 FL (ref 78–100)
NONHDLC SERPL-MCNC: 180 MG/DL
PLATELET # BLD AUTO: 207 10E9/L (ref 150–450)
POTASSIUM SERPL-SCNC: 3.7 MMOL/L (ref 3.4–5.3)
PROT SERPL-MCNC: 7.4 G/DL (ref 6.8–8.8)
RBC # BLD AUTO: 4.97 10E12/L (ref 4.4–5.9)
SODIUM SERPL-SCNC: 141 MMOL/L (ref 133–144)
T4 FREE SERPL-MCNC: 0.95 NG/DL (ref 0.76–1.46)
TRIGL SERPL-MCNC: 79 MG/DL
TSH SERPL DL<=0.005 MIU/L-ACNC: 7.08 MU/L (ref 0.4–4)
WBC # BLD AUTO: 5.3 10E9/L (ref 4–11)

## 2019-11-12 PROCEDURE — 84439 ASSAY OF FREE THYROXINE: CPT | Performed by: INTERNAL MEDICINE

## 2019-11-12 PROCEDURE — 99396 PREV VISIT EST AGE 40-64: CPT | Performed by: INTERNAL MEDICINE

## 2019-11-12 PROCEDURE — 80053 COMPREHEN METABOLIC PANEL: CPT | Performed by: INTERNAL MEDICINE

## 2019-11-12 PROCEDURE — 84443 ASSAY THYROID STIM HORMONE: CPT | Performed by: INTERNAL MEDICINE

## 2019-11-12 PROCEDURE — 36415 COLL VENOUS BLD VENIPUNCTURE: CPT | Performed by: INTERNAL MEDICINE

## 2019-11-12 PROCEDURE — 80061 LIPID PANEL: CPT | Performed by: INTERNAL MEDICINE

## 2019-11-12 PROCEDURE — 85027 COMPLETE CBC AUTOMATED: CPT | Performed by: INTERNAL MEDICINE

## 2019-11-12 ASSESSMENT — MIFFLIN-ST. JEOR: SCORE: 1762.51

## 2019-11-12 NOTE — PATIENT INSTRUCTIONS
I would recommend getting the new shingles shot called shingrix, but I would do it at your pharmacy as they can check with the insurance company to see if it is paid for.    Frank Elizabeth M.D.

## 2019-11-12 NOTE — PROGRESS NOTES
SUBJECTIVE:   CC: Ever Garnica is an 54 year old male who presents for preventative health visit.     The patient is doing well.  He works out some.  He is  and has 3 kids ages 12 through 16.  He has a neuroma of his foot and is using an insert.  He otherwise is doing quite well.    Healthy Habits:    Getting at least 3 servings of Calcium per day:  Yes    Bi-annual eye exam:  Yes    Dental care twice a year:  Yes    Sleep apnea or symptoms of sleep apnea:  None    Diet:  Regular (no restrictions)    Frequency of exercise:  4-5 days/week    Duration of exercise:  45-60 minutes    Taking medications regularly:  Yes    Barriers to taking medications:  Not applicable    Medication side effects:  Not applicable    PHQ-2 Total Score:    Additional concerns today:  No              Today's PHQ-2 Score:   PHQ-2 ( 1999 [employer]) 10/3/2019   Q1: Little interest or pleasure in doing things 0   Q2: Feeling down, depressed or hopeless 0   PHQ-2 Score 0       Abuse: Current or Past(Physical, Sexual or Emotional)- No  Do you feel safe in your environment? Yes        Social History     Tobacco Use     Smoking status: Never Smoker     Smokeless tobacco: Never Used   Substance Use Topics     Alcohol use: No     Alcohol/week: 0.0 standard drinks     Comment: 0     If you drink alcohol do you typically have >3 drinks per day or >7 drinks per week? No               Past Medical History:      Past Medical History:   Diagnosis Date     Genital herpes 2015    buttock paresthesia, urine symptoms     H/O colonoscopy 2016    nl     Hypothyroid 8/15    not on meds     Seasonal allergies              Past Surgical History:      Past Surgical History:   Procedure Laterality Date     C ANESTH,REPAIR LO ABD HERNIA NOS      age less than 10 years     clavicular surgery for fx  2013     COLONOSCOPY N/A 3/10/2016    Procedure: COLONOSCOPY;  Surgeon: Frank Ayala MD;  Location:  GI     HERNIA REPAIR  2005    bilat     VASECTOMY  10/2008              Social History:     Social History     Socioeconomic History     Marital status:      Spouse name: Not on file     Number of children: 3     Years of education: Not on file     Highest education level: Not on file   Occupational History     Occupation: , Vastrmmetician     Employer: [employer]   Social Needs     Financial resource strain: Not on file     Food insecurity:     Worry: Not on file     Inability: Not on file     Transportation needs:     Medical: Not on file     Non-medical: Not on file   Tobacco Use     Smoking status: Never Smoker     Smokeless tobacco: Never Used   Substance and Sexual Activity     Alcohol use: No     Alcohol/week: 0.0 standard drinks     Comment: 0     Drug use: No     Sexual activity: Yes     Partners: Female   Lifestyle     Physical activity:     Days per week: Not on file     Minutes per session: Not on file     Stress: Not on file   Relationships     Social connections:     Talks on phone: Not on file     Gets together: Not on file     Attends Restorationist service: Not on file     Active member of club or organization: Not on file     Attends meetings of clubs or organizations: Not on file     Relationship status: Not on file     Intimate partner violence:     Fear of current or ex partner: Not on file     Emotionally abused: Not on file     Physically abused: Not on file     Forced sexual activity: Not on file   Other Topics Concern      Service No     Blood Transfusions No     Caffeine Concern Not Asked     Occupational Exposure Not Asked     Hobby Hazards Not Asked     Sleep Concern Not Asked     Stress Concern Not Asked     Weight Concern Not Asked     Special Diet Not Asked     Back Care Not Asked     Exercise No     Comment: not scheduled     Bike Helmet Not Asked     Seat Belt Yes     Self-Exams Not Asked     Parent/sibling w/ CABG, MI or angioplasty before 65F 55M? Not Asked   Social History Narrative     Not on file             Family  History:   reviewed         Allergies:   No Known Allergies          Medications:     No current outpatient medications on file.               Review of Systems:   The 10 point Review of Systems is negative other than noted in the HPI           Physical Exam:   Blood pressure 116/76, pulse 58, temperature 98  F (36.7  C), temperature source Oral, height 1.829 m (6'), weight 88.5 kg (195 lb), SpO2 98 %.    Exam:  Constitutional: healthy appearing, alert and in no distress  Heent: Normocephalic. Head without obvious masses or lesions. PERRLDC, EOMI. Mouth exam within normal limits: tongue, mucous membranes, posterior pharynx all normal, no lesions or abnormalities seen.  Tm's and canals within normal limits bilaterally. Neck supple, no nuchal rigidity or masses. No supraclavicular, or cervical adenopathy. Thyroid symmetric, no masses.  Cardiovascular: Regular rate and rhythm, no murmer, rub or gallops.  JVP not elevated, no edema.  Carotids within normal limits bilaterally, no bruits.  Respiratory: Normal respiratory effort.  Lungs clear, normal flow, no wheezing or crackles.  Breasts: Normal bilaterally.  No masses or lesions.  Nipples within normal limites.  No axillary lesions or nodes.  Gastrointestinal: Normal active bowel sounds.   Soft, not tender, no masses, guarding or rebound.  No hepatosplenomegaly.   Genitourinary: Rectal min bph  Musculoskeletal: extremities normal, no gross deformities noted.  Skin: no suspicious lesions or rashes   Neurologic: Mental status within normal limits.  Speech fluent.  No gross motor abnormalities and gait intact.  Psychiatric: mentation appears normal and affect normal.         Data:   Labs sent        Assessment:   1. Normal complete physical exam  2. Elevated tsh, follow up today  3. gen herpes, no issues  4. Seasonal allergies  5. hcm         Plan:   shingrix at pharm  Exercise, diet  Letter with labs      Frank Elizabeth M.D.

## 2019-11-14 NOTE — RESULT ENCOUNTER NOTE
It was nice seeing you for your physical.  You should be able to review your test results.    Your CBC your complete blood count is normal with no signs of blood disorders.  Your chemistry panel shows no diabetes.  Your blood salts, kidney tests, liver tests, and proteins are all normal.    Of the 2 thyroid tests one is just barely off as you can see.  It has fluctuated over the years and at this point I do not believe we need to do anything but I would simply repeat it in 6 months.  There really is nothing that you can do about this either.  The thyroid test can often go up and down and it is not concerning.    Your total cholesterol is high at 248.  Your HDL or good cholesterol is quite good at 68.  Your LDL or bad cholesterol is higher this year at 164.  I do not believe you need medication but I would stress regular exercise and a healthy diet for this.    I am happy to bring you this overall excellent report.  If you have any questions let me know.    Frank Elizabeth M.D.

## 2019-12-05 ENCOUNTER — OFFICE VISIT (OUTPATIENT)
Dept: PODIATRY | Facility: CLINIC | Age: 54
End: 2019-12-05
Payer: COMMERCIAL

## 2019-12-05 VITALS
HEIGHT: 72 IN | HEART RATE: 82 BPM | BODY MASS INDEX: 27.63 KG/M2 | WEIGHT: 204 LBS | SYSTOLIC BLOOD PRESSURE: 133 MMHG | DIASTOLIC BLOOD PRESSURE: 76 MMHG

## 2019-12-05 DIAGNOSIS — D36.10 NEUROMA: Primary | ICD-10-CM

## 2019-12-05 PROCEDURE — 99213 OFFICE O/P EST LOW 20 MIN: CPT | Performed by: PODIATRIST

## 2019-12-05 ASSESSMENT — MIFFLIN-ST. JEOR: SCORE: 1803.34

## 2019-12-05 NOTE — LETTER
12/5/2019         RE: Ever Garnica  [address] [place] [facility] [address]        Dear Colleague,    Thank you for referring your patient, Ever Garnica, to the [facility]. Please see a copy of my visit note below.    PATIENT HISTORY:  Ever Garnica is a 54 year old male who presents to clinic for L foot pain recheck.  Prior visit for neuroma.  Symptoms overall greatly improved with otc orthotics w/metatarsal pads.  Pt using these for about 6 months.  In recent days he has had increased pain on the L in the 3rd IS, some on the R as well, but more minimal.  Reports some numbness into L 3rd and 4th toes.  1-4/10 pain with walking.  No fevers, chills, injury.  Nonsmoker.  Nondiabetic.  No related family hx.     EXAM:Vitals: /76   Pulse 82   Ht 1.829 m (6')   Wt 92.5 kg (204 lb)   BMI 27.67 kg/m     BMI= Body mass index is 27.67 kg/m .    General appearance: Patient is alert and fully cooperative with history & exam.  No sign of distress is noted during the visit.     Dermatologic: Skin is intact to both feet without significant lesions, rash or abrasion.  No paronychia or evidence of soft tissue infection is noted.     Vascular: DP & PT pulses are intact & regular bilaterally.  No significant edema or varicosities noted.  CFT and skin temperature are normal to both lower extremities.     Neurologic: Lower extremity sensation is intact to light touch.  No evidence of weakness or contracture in the lower extremities.  No evidence of neuropathy.     Musculoskeletal:  No R foot pain on exam with palpation of the 3rd IS, but L 3rd IS pain to palpation noted. Mild chelo's click.  Patient is ambulatory without assistive device or brace.  No gross ankle deformity noted.  No foot or ankle joint effusion is noted.     ASSESSMENT: b/l neuromas     PLAN:  Reviewed patient's chart in epic.  Discussed condition and treatment options including pros and cons.    L with clear evidence of  neuroma, possibly on R as well.    Treatment options for Joy's neuroma were discussed.  Non-operative treatment would include wide shoes, orthotics, injection and avoidance of activities that cause pain.  Patient is aware of the progressive nature of this problem and I would anticipate further nerve symptoms over time.  Current symptoms will likely progress and limitations of activities and shoe intolerance may escalate over the years.  Non-operative treatments can be quite effective but that might depend on how much damage has occurred to the nerve.  Narrow fitting shoes will not likely be tolerated with or without injury.      We discussed surgical treatment options.  This typically involves removal of a segment of the nerve.      Pt deferred injection today.  He will try a new pair of OTC inserts.  Suggested he consider custom orthoitcs, pt deferred today.      Follow-up prn.    Rafael Jones DPM, FACFAS    Weight management plan: Patient was referred to their PCP to discuss a diet and exercise plan.        Again, thank you for allowing me to participate in the care of your patient.        Sincerely,        Rafael Jones DPM

## 2019-12-05 NOTE — PROGRESS NOTES
PATIENT HISTORY:  Ever Garnica is a 54 year old male who presents to clinic for L foot pain recheck.  Prior visit for neuroma.  Symptoms overall greatly improved with otc orthotics w/metatarsal pads.  Pt using these for about 6 months.  In recent days he has had increased pain on the L in the 3rd IS, some on the R as well, but more minimal.  Reports some numbness into L 3rd and 4th toes.  1-4/10 pain with walking.  No fevers, chills, injury.  Nonsmoker.  Nondiabetic.  No related family hx.     EXAM:Vitals: /76   Pulse 82   Ht 1.829 m (6')   Wt 92.5 kg (204 lb)   BMI 27.67 kg/m    BMI= Body mass index is 27.67 kg/m .    General appearance: Patient is alert and fully cooperative with history & exam.  No sign of distress is noted during the visit.     Dermatologic: Skin is intact to both feet without significant lesions, rash or abrasion.  No paronychia or evidence of soft tissue infection is noted.     Vascular: DP & PT pulses are intact & regular bilaterally.  No significant edema or varicosities noted.  CFT and skin temperature are normal to both lower extremities.     Neurologic: Lower extremity sensation is intact to light touch.  No evidence of weakness or contracture in the lower extremities.  No evidence of neuropathy.     Musculoskeletal:  No R foot pain on exam with palpation of the 3rd IS, but L 3rd IS pain to palpation noted. Mild chelo's click.  Patient is ambulatory without assistive device or brace.  No gross ankle deformity noted.  No foot or ankle joint effusion is noted.     ASSESSMENT: b/l neuromas     PLAN:  Reviewed patient's chart in epic.  Discussed condition and treatment options including pros and cons.    L with clear evidence of neuroma, possibly on R as well.    Treatment options for Joy's neuroma were discussed.  Non-operative treatment would include wide shoes, orthotics, injection and avoidance of activities that cause pain.  Patient is aware of the progressive nature of  this problem and I would anticipate further nerve symptoms over time.  Current symptoms will likely progress and limitations of activities and shoe intolerance may escalate over the years.  Non-operative treatments can be quite effective but that might depend on how much damage has occurred to the nerve.  Narrow fitting shoes will not likely be tolerated with or without injury.      We discussed surgical treatment options.  This typically involves removal of a segment of the nerve.      Pt deferred injection today.  He will try a new pair of OTC inserts.  Suggested he consider custom orthoitcs, pt deferred today.      Follow-up prn.    Rafael Jones DPM, FACFAS    Weight management plan: Patient was referred to their PCP to discuss a diet and exercise plan.

## 2019-12-05 NOTE — PATIENT INSTRUCTIONS
Thank you for choosing [facility]!    Follow up as needed    DR. MCCALL'S CLINIC LOCATIONS     MONDAY  [place] TUESDAY & FRIDAY AM  [place]   [address] [facility]   [address]   [place] [address] [place] [address]   [phone]  [phone]  [phone]       WEDNESDAY  [facility] [phone]   [address] [place] [address] APPOINTMENTS: [phone]   [place] [address] BILLING QUESTIONS: [phone]   [phone]       JOY'S NEUROMA   Joy's neuroma is an enlargement or thickening of a nerve in the foot. It is also sometimes referred to as an intermetatarsal neuroma, interdigital neuroma, Joy's metatarsalgia (pain in the metatarsal head area), brent-neural fibrosis (scar tissue around a nerve) or entrapment neuropathy (abnormal nerve due to compression). A Joy's neuroma most commonly occurs in the third interspace between the third and fourth toes, followed by the second interspace between the second and third toes. Joy's neuromas have also occurred in the fourth and first interspaces, but these are rare. If you have a Joy's neuroma, there is a 15% chance it will occur bilaterally (on both feet). Joy's neuromas occur most commonly in women who are between 30 to 50 years old. The reason they are more common in women is thought to be due to the shoes women wear.   CAUSES: A Joy's neuroma is thought to be caused by trauma to the nerve, but scientists are still not sure about the exact cause of the trauma. The trauma may be caused by the metatarsal heads, the deep transverse intermetatarsal ligament (holds the metatarsal heads together) or an intermetatarsal bursa (fluid-filled sac). All of these structures can cause compression/trauma on the nerve which initially causes swelling and injury in the nerve. Over time if the compression/trauma continues, the nerve repairs itself with very  "fibrous tissue that leads to enlargement and thickening of the nerve. Other causes of trauma to the nerve may include; overpronation (foot rolls inward), hypermobility (too much motion), cavo varus (high arch foot) and excessive dorsiflexion (toes bend upward) of the toes. These biomechanical (howthe foot moves) factors may cause trauma to the nerve with every step. If the nerve becomes irritated and enlarged then it takes up more space and gets even more compressed and irritated. It becomes a vicious cycle.   SIGNS & SYMPTOMS  - Pain (sharp, stabbing, throbbing, shooting)   - Numbness   - Tingling or \"pins & needles\"   - Burning   - Cramping   - Feeling that you are stepping on something or that something is in your shoe   - Initially the symptoms may happen once in a while, but as the condition gets     worse, the symptoms may happen all of the time   - It usually feels better by taking off your shoe and massaging your foot     DIAGNOSIS: Your podiatrist will ask many questions about your signs and symptoms and will perform a physical exam. Some of the exams may include a web space compression test. This is done by squeezing the metatarsals together with one hand and using the thumb and index finger of the other hand to compress the affected web space to reproduce the pain/symptoms. A palpable click (Katalina's click) is usually present. This test may also cause pain to shoot into the toes and that is called a Tinel's sign. Pool's test involves squeezing the metatarsals together and moving the toes up and down for 30 seconds. This will usually cause pain or it will bring on your other symptoms. Kinney's sign is positive when you stand and the affected toes spread apart. A Joy's neuroma is usually diagnosed based on the history and physical exam findings, but sometimes other tests such as an x-ray, ultrasound or an MRI are needed.   TREATMENT  1.  Footwear Changes: Wear shoes that are wide and deep in the " toe box so they  do not put pressure on your toes and metatarsals. Avoid wearing high heels because they cause increased pressure on the ball of your foot (forefoot).    2.  Metatarsal Pads: These help to lift and separate the metatarsal heads to take pressure off of the nerve. They are placed just behind where you feel the pain, not on top of the painful spot.   3.  Activity modification: For example, you may try swimming instead of running until your symptoms go away.   4.  Taping   5.  Icing   6.  NSAIDs (anti-inflammatories): aleve, ibuprofen, etc.   7.  Arch Supports or Orthotics: These help to control some of the abnormal motion in your feet. The abnormal motion can lead to extra torque and pressure on the nerve.   8.  Physical Therapy  9.  Cortisone Injection: Helps to decrease the size of the irritated, enlarged nerve.   10.  Sclerosing Alcohol Injection: Helps to destroy the nerve chemically, which causes permanent numbness    SURGERY  If conservative treatment does not help surgery may be needed. Surgery may involve cutting out the nerve or cutting the intermetatarsalligament. Studies have shown surgery has an 80-85% success rate.  Will result in numbness    PREVENTION  -Avoid wearing narrow, pointed toe shoes, or high heels          BODY WEIGHT AND YOUR FEET  The following information is included in the after visit summary for all patients. Body weight can be a sensitive issue to discuss in clinic, but we think the following information is very important. Although we focus on the feet and ankles, we do support the overall health of our patients.     Many things can cause foot and ankle problems. Foot structure, activity level, foot mechanics and injuries are common causes of pain. One very important issue that often goes unmentioned, is body weight. Extra weight can cause increased stress on muscles, ligaments, bones and tendons. Sometimes just a few extra pounds is all it takes to put one over her/his  threshold. Without reducing that stress, it can be difficult to alleviate pain. As Foot & Ankle specialists, our job is addressing the lower extremity problem and possible causes. Regarding extra body weight, we encourage patients to discuss diet and weight management plans with their primary care doctors. It is this team approach that gives you the best opportunity for pain relief and getting you back on your feet.      [facility] has a Comprehensive Weight Management Program. This program includes counseling, education, non-surgical and surgical approaches to weight loss. If you are interested in learning more either talk to you primary care provider or call [phone].

## 2019-12-16 ENCOUNTER — OFFICE VISIT (OUTPATIENT)
Dept: FAMILY MEDICINE | Facility: CLINIC | Age: 54
End: 2019-12-16
Payer: COMMERCIAL

## 2019-12-16 VITALS
SYSTOLIC BLOOD PRESSURE: 112 MMHG | OXYGEN SATURATION: 95 % | BODY MASS INDEX: 27.4 KG/M2 | HEART RATE: 58 BPM | WEIGHT: 202 LBS | DIASTOLIC BLOOD PRESSURE: 70 MMHG | TEMPERATURE: 97.4 F

## 2019-12-16 DIAGNOSIS — R32 URINARY INCONTINENCE, UNSPECIFIED TYPE: ICD-10-CM

## 2019-12-16 DIAGNOSIS — L82.1 SEBORRHEIC KERATOSES: Primary | ICD-10-CM

## 2019-12-16 PROCEDURE — 99213 OFFICE O/P EST LOW 20 MIN: CPT | Performed by: INTERNAL MEDICINE

## 2019-12-16 NOTE — PROGRESS NOTES
The patient presents for 2 issues.    The patient has a mole that is had for a few in the right lower abdomen.  It does not bother him.    The patient has had urinary leakage for well over a year.  He finds that after he urinates even if he shakes things out once he starts going he can get a few drops in his pants.  This also occurs at night in the middle of the night when he sits to urinate and then stands to go back to bed.  He otherwise notes no urine issues.  No burning or blood.  His stream and flow is fine.  He also notes no loss of control of his bowels or low back pain.  No leg tingling numbness or weakness.  No erectile issues.    Past Medical History:   Diagnosis Date     Genital herpes 2015    buttock paresthesia, urine symptoms     H/O colonoscopy 2016    nl     Hypothyroid 8/15    not on meds     Seasonal allergies      Past Surgical History:   Procedure Laterality Date     C ANESTH,REPAIR LO ABD HERNIA NOS      age less than 10 years     clavicular surgery for fx  2013     COLONOSCOPY N/A 3/10/2016    Procedure: COLONOSCOPY;  Surgeon: Frank Ayala MD;  Location:  GI     HERNIA REPAIR  2005    bilat     VASECTOMY  10/2008     Social History     Socioeconomic History     Marital status:      Spouse name: Not on file     Number of children: 3     Years of education: Not on file     Highest education level: Not on file   Occupational History     Occupation: , [employer]     Employer: [employer] - is now [employer]   Social Needs     Financial resource strain: Not on file     Food insecurity:     Worry: Not on file     Inability: Not on file     Transportation needs:     Medical: Not on file     Non-medical: Not on file   Tobacco Use     Smoking status: Never Smoker     Smokeless tobacco: Never Used   Substance and Sexual Activity     Alcohol use: No     Alcohol/week: 0.0 standard drinks     Comment: 0     Drug use: No     Sexual activity: Yes     Partners: Female   Lifestyle     Physical activity:      Days per week: Not on file     Minutes per session: Not on file     Stress: Not on file   Relationships     Social connections:     Talks on phone: Not on file     Gets together: Not on file     Attends Buddhism service: Not on file     Active member of club or organization: Not on file     Attends meetings of clubs or organizations: Not on file     Relationship status: Not on file     Intimate partner violence:     Fear of current or ex partner: Not on file     Emotionally abused: Not on file     Physically abused: Not on file     Forced sexual activity: Not on file   Other Topics Concern      Service No     Blood Transfusions No     Caffeine Concern Not Asked     Occupational Exposure Not Asked     Hobby Hazards Not Asked     Sleep Concern Not Asked     Stress Concern Not Asked     Weight Concern Not Asked     Special Diet Not Asked     Back Care Not Asked     Exercise No     Comment: not scheduled     Bike Helmet Not Asked     Seat Belt Yes     Self-Exams Not Asked     Parent/sibling w/ CABG, MI or angioplasty before 65F 55M? Not Asked   Social History Narrative     Not on file     No current outpatient medications on file.     No Known Allergies  FAMILY HISTORY NOTED AND REVIEWED    REVIEW OF SYSTEMS: above    PHYSICAL EXAM    /70 (BP Location: Right arm, Cuff Size: Adult Regular)   Pulse 58   Temp 97.4  F (36.3  C) (Tympanic)   Wt 91.6 kg (202 lb)   SpO2 95%   BMI 27.40 kg/m      Patient appears non toxic  Has small seb keratosis right lower abdomen    ASSESSMENT:  1. Urinary dribbling, doubt cord issue, discussed with patient louise steele to avoid this  2. seb ker    PLAN:  Above    Frank Elizabeth M.D.

## 2020-02-05 ENCOUNTER — NURSE TRIAGE (OUTPATIENT)
Dept: NURSING | Facility: CLINIC | Age: 55
End: 2020-02-05

## 2020-02-05 ENCOUNTER — OFFICE VISIT (OUTPATIENT)
Dept: FAMILY MEDICINE | Facility: CLINIC | Age: 55
End: 2020-02-05
Payer: COMMERCIAL

## 2020-02-05 VITALS
BODY MASS INDEX: 27.79 KG/M2 | SYSTOLIC BLOOD PRESSURE: 113 MMHG | HEIGHT: 72 IN | HEART RATE: 59 BPM | WEIGHT: 205.2 LBS | TEMPERATURE: 97.5 F | DIASTOLIC BLOOD PRESSURE: 69 MMHG | OXYGEN SATURATION: 96 %

## 2020-02-05 DIAGNOSIS — J20.8 VIRAL BRONCHITIS: Primary | ICD-10-CM

## 2020-02-05 DIAGNOSIS — K64.4 EXTERNAL HEMORRHOIDS: ICD-10-CM

## 2020-02-05 PROCEDURE — 99214 OFFICE O/P EST MOD 30 MIN: CPT | Performed by: INTERNAL MEDICINE

## 2020-02-05 RX ORDER — METHYLPREDNISOLONE 4 MG
TABLET, DOSE PACK ORAL
Qty: 21 TABLET | Refills: 0 | Status: SHIPPED | OUTPATIENT
Start: 2020-02-05 | End: 2021-07-23

## 2020-02-05 RX ORDER — BENZONATATE 100 MG/1
100-200 CAPSULE ORAL 3 TIMES DAILY PRN
Qty: 30 CAPSULE | Refills: 1 | Status: SHIPPED | OUTPATIENT
Start: 2020-02-05 | End: 2021-07-23

## 2020-02-05 RX ORDER — CODEINE PHOSPHATE AND GUAIFENESIN 10; 100 MG/5ML; MG/5ML
1-2 SOLUTION ORAL
Qty: 200 ML | Refills: 0 | Status: SHIPPED | OUTPATIENT
Start: 2020-02-05 | End: 2021-07-23

## 2020-02-05 ASSESSMENT — MIFFLIN-ST. JEOR: SCORE: 1808.78

## 2020-02-05 NOTE — TELEPHONE ENCOUNTER
"Ever is calling and states for the last nine days has a horrible cough.  Ever noticed blood in bowel movements.  Blood is only in stool.  Ever states that this has happened three times in the last week.  Denies dizziness or abdominal pain.      Reason for Disposition    [1] Rectal bleeding is minimal (e.g., blood just on toilet paper, few drops, streaks on surface of normal formed BM) AND [2] bleeding recurs 3 or more times on treatment    Additional Information    Negative: Shock suspected (e.g., cold/pale/clammy skin, too weak to stand, low BP, rapid pulse)    Negative: Difficult to awaken or acting confused (e.g., disoriented, slurred speech)    Negative: Passed out (i.e., lost consciousness, collapsed and was not responding)    Negative: [1] Vomiting AND [2] contains red blood or black (\"coffee ground\") material  (Exception: few red streaks in vomit that only happened once)    Negative: Sounds like a life-threatening emergency to the triager    Negative: SEVERE rectal bleeding (large blood clots; on and off, or constant bleeding)    Negative: SEVERE dizziness (e.g., unable to stand, requires support to walk, feels like passing out now)    Negative: [1] MODERATE rectal bleeding (small blood clots, passing blood without stool, or toilet water turns red) AND [2] more than once a day    Negative: Pale skin (pallor) of new onset or worsening    Negative: Tarry or jet black-colored stool (not dark green)    Negative: [1] Constant abdominal pain AND [2] present > 2 hours    Negative: Rectal foreign body (i.e., now or within past week;  inserted or swallowed)    Negative: High-risk adult (e.g., prior surgery on aorta, abdominal aortic aneurysm)    Negative: Taking Coumadin (warfarin) or other strong blood thinner, or known bleeding disorder (e.g., thrombocytopenia)    Negative: Known cirrhosis of the liver (or history of liver failure or ascites)    Negative: [1] Colonoscopy AND [2] in past 72 hours    Negative: Patient " sounds very sick or weak to the triager    Negative: MODERATE rectal bleeding (small blood clots, passing blood without stool, or toilet water turns red)    Negative: MILD rectal bleeding (more than just a few drops or streaks)    Negative: Cancer of rectum or intestines (colon)    Negative: Radiation therapy to lower abdomen or pelvis    Protocols used: RECTAL BLEEDING-A-AH

## 2020-02-05 NOTE — PATIENT INSTRUCTIONS
Take Robitussin with codeine at bedtime to help suppress your cough during sleep.    Take Tessalon during the daytime for cough suppression.    If after 2 to 3 days of taking the medications, your cough continues to be bothersome then you may start taking the steroid pack.    Call doctor if you develop any side effects from the medication/s.    Follow printed guidelines for hemorrhoid care/prevention.    Seek immediate medical attention if you develop fever, chest pain, worsening phlegmy cough, shortness of breath, vomiting, diarrhea, rectal/anal pain, increasing blood in your stools.      Patient Education     Hemorrhoids    Hemorrhoids are swollen and inflamed veins inside the rectum and near the anus. The rectum is the last several inches of the colon. The anus is the passage between the rectum and the outside of the body.  Causes  The veins can become swollen due to increased pressure in them. This is most often caused by:    Chronic constipation or diarrhea    Straining when having a bowel movement    Sitting too long on the toilet    A low-fiber diet    Pregnancy  Symptoms    Bleeding from the rectum (this may be noticeable after bowel movements)    Lump near the anus    Itching around the anus    Pain around the anus  There are different types of hemorrhoids. Depending on the type you have and the severity, you may be able to treat yourself at home. In some cases, a procedure may be the best treatment option. Your healthcare provider can tell you more about this, if needed.  Home care  General care    To get relief from pain or itching, try:  ? Medicines. Your healthcare provider may recommend stool softeners, suppositories, or laxatives to help manage constipation. Use these exactly as directed.  ? Sitz baths. A sitz bath involves sitting in a few inches of warm bath water. Be careful not to make the water so hot that you burn yourself--test it before sitting in it. Soak for about 10 to 15 minutes a few  times a day. This may help relieve pain.  ? Topical products. Your healthcare provider may prescribe or recommend creams, ointments, or pads that can be applied to the hemorrhoid. Use these exactly as directed.  Tips to help prevent hemorrhoids    Eat more fiber. Fiber adds bulk to stool and absorbs water as it moves through your colon. This makes stool softer and easier to pass.  ? Increase the fiber in your diet with more fiber-rich foods. These include fresh fruit, vegetables, and whole grains.  ? Take a fiber supplement or bulking agent, if advised by your healthcare provider. These include products such as psyllium or methylcellulose.    Drink more water. Your healthcare provider may direct you to drink plenty of water. This can help keep stool soft.    Be more active. Frequent exercise aids digestion and helps prevent constipation. It may also help make bowel movements more regular.    Don t strain during bowel movements. This can make hemorrhoids more likely. Also, don t sit on the toilet for long periods of time.  Follow-up care  Follow up with your healthcare provider as advised. If a culture or imaging tests were done, someone will let you know the results when they are ready. This may take a few days or longer. If your healthcare provider recommends a procedure for your hemorrhoids, these options can be discussed. Options may include surgery and outpatient office treatments.  When to seek medical advice  Call your healthcare provider right away if any of these occur:    Increased bleeding from the rectum    Increased pain around the rectum or anus    Weakness or dizziness  Call 911  Call 911 if any of these occur:    Trouble breathing or swallowing    Fainting or loss of consciousness    Unusually fast heart rate    Vomiting blood    Large amounts of blood in stool or black, tarry stools  Date Last Reviewed: 9/1/2017 2000-2019 [employer]. [address] [place] [address], [place] [address]. All  rights reserved. This information is not intended as a substitute for professional medical care. Always follow your healthcare professional's instructions.           Patient Education     Understanding Hemorrhoids    Hemorrhoid tissues are  cushions  of blood vessels that swell slightly during bowel movements. Too much pressure on the anal canal can make these tissues remain enlarged, become inflamed, and cause symptoms. This can happen both inside and outside the anal canal.  Parts of the anal canal  The parts of the anal canal are:    Internal hemorrhoid tissue is in the upper area of the anal canal.    The rectum is the last several inches of the colon. This is where stool is stored prior to bowel movements.    Anal sphincters are ring-shaped muscles that expand and contract to control the anal opening.    External hemorrhoid tissue lies under the anal skin.    The anus is the passage between the rectum and the outside of the body.  Normal hemorrhoid tissue  Hemorrhoid tissues play an important role in helping your body eliminate waste. Food passes from the stomach through the intestines. The waste (stool) then travels through the colon to the rectum. It is stored in the rectum until it s ready to be passed from the anus. During bowel movements, hemorrhoids swell with blood and become slightly larger. This swelling helps protect and cushion the anal canal as stool passes from the body. Once the stool has passed, the tissues stop swelling and return to normal.  Problem hemorrhoids  Pressure due to straining or other factors can cause hemorrhoid tissues to remain swollen. When this happens to the hemorrhoid tissues in the anal canal they re called internal hemorrhoids. Swollen tissues around the anal opening are called external hemorrhoids. Depending on the location, your symptoms can differ.    Internal hemorrhoids often happen in clusters around the wall of the anal canal. They are usually painless. But they may  prolapse (protrude out of the anus) due to straining or pressure from hard stool. After the bowel movement is over, they may then reduce (return inside the body). Internal hemorrhoids often bleed. They can also discharge mucus.      External hemorrhoids are located at the anal opening, just beneath the skin. These tissues rarely cause problems unless they thrombose (form a blood clot). When this happens, a hard, bluish lump may appear. A thrombosed hemorrhoid also causes sudden, severe pain. In time, the clot may go away on its own. This sometimes leaves a  skin tag  of tissue stretched by the clot.  Hemorrhoid symptoms  Hemorrhoid symptoms may include:    Pain or a burning sensation    Bleeding during bowel movements    Protrusion of tissue from the anus    Itching around the anus  Causes of hemorrhoids  There s no single cause of hemorrhoids. Most often, though, they are caused by too much pressure on the anal canal. This can be due to:    Chronic (ongoing) constipation    Straining during bowel movements    Sitting too long on the toilet    Strenuous exercise or heavy lifting    Pregnancy and childbirth    Aging    Diarrhea  Date Last Reviewed: 7/1/2016 2000-2019 [employer]. [address] [place] [address]. All rights reserved. This information is not intended as a substitute for professional medical care. Always follow your healthcare professional's instructions.           Patient Education     Treating Hemorrhoids: Self-Care    Follow your healthcare provider s advice about caring for your hemorrhoids at home. Some treatments help relieve symptoms right away. Others involve making changes in your diet and exercise habits. These can help ease constipation and prevent hemorrhoid symptoms from coming back.  Relieving symptoms  Your healthcare provider may prescribe anti-inflammatory medicine to help ease your symptoms. The following tips will also help relieve pain and swelling.    Take  sitz baths. Taking a sitz bath means sitting in a few inches of warm bath water. Soaking for 10 minutes twice a day can provide welcome relief from painful hemorrhoids. It can also help the area stay clean.    Develop good bowel habits. Use the bathroom when you need to. Don t ignore the urge to move your bowels. This can lead to constipation, hard stools, and straining. Also, don t read while on the toilet. Sit only as long as needed. Wipe gently with soft, unscented toilet tissue or baby wipes.    Use ice packs. Placing an ice pack on a thrombosed external hemorrhoid can help relieve pain right away. It will also help reduce the blood clot. Use the ice for 15 to 20 minutes at a time. Keep a cloth between the ice and your skin to prevent skin damage.    Use other measures. Laxatives and enemas can help ease constipation. But use them only on your healthcare provider s advice. For symptom relief, try using cotton pads soaked in witch hazel. These are available at most drugstores. Over-the-counter hemorrhoid ointments and petroleum jelly can also provide relief.  Add fiber to your diet  Adding fiber to your diet can help relieve constipation by making stools softer and easier to pass. To increase your fiber intake, your healthcare provider may recommend a bulking agent, such as psyllium. This is a high-fiber supplement available at most grocery stores and drugstores. Eating more fiber-rich foods will also help. There are two types of fiber:    Insoluble fiber is the main ingredient in bulking agents. It s also found in foods such as wheat bran, whole-grain breads, fresh fruits, and vegetables.    Soluble fiber is found in foods such as oat bran. Although soluble fiber is good for you, it may not ease constipation as much as foods high in insoluble fiber.  Drink more water  Along with a high-fiber diet, drinking more water can help ease constipation. This is because insoluble fiber absorbs water, making stools soft  and bulky. Be sure to drink plenty of water throughout the day. Drinking fruit juices, such as prune juice or apple juice, can also help prevent constipation.  Get more exercise  Regular exercise aids digestion and helps prevent constipation. It s also great for your health. So talk with your healthcare provider about starting an exercise program. Low-impact activities, such as swimming or walking, are good places to start. Take it easy at first. And remember to drink plenty of water when you exercise.  High-fiber foods  High-fiber foods offer many benefits. By making your stools softer, they help heal and prevent swollen hemorrhoids. They may also help reduce the risk of colon and rectal cancer. Best of all, they re usually low in calories and taste great. Here are some examples of fiber-rich foods.    Whole grains, such as wheat bran, corn bran, and brown rice.    Vegetables, especially carrots, broccoli, cabbage, and peas.    Fruits, such as apples, bananas, raisins, peaches, and pears.    Nuts and legumes, especially peanuts, lentils, and kidney beans.  Easy ways to add fiber  The tips below offer some simple ways to add more high-fiber foods to your meals.    Start your day with a high-fiber breakfast. Eat a wheat bran cereal along with a sliced banana. Or, try peanut butter on whole-wheat toast.    Eat carrot sticks for snacks. They re easy to prepare, taste great, and are low in calories.    Use whole-grain breads instead of white bread for sandwiches.    Eat fruits for treats. Try an apple and some raisins instead of a candy bar.   Date Last Reviewed: 7/1/2016 2000-2019 [employer]. [address] [place] [address], [place] [address]. All rights reserved. This information is not intended as a substitute for professional medical care. Always follow your healthcare professional's instructions.           Patient Education     Taking a Sitz Bath  A sitz bath is a type of therapy done by sitting in warm,  shallow water. It can help soothe pain, itching, and other symptoms in the anal and genital areas. It can also help keep these areas clean if you can t take a bath or shower. Sitz is from the [place] word sitzen, which means to sit.  Why a sitz bath is done  A sitz bath helps clean and treat certain problems in the anal area, genital area, and the perineum. The perineum is the area between the anus and the vulva in women. In men, it is between the anus and the scrotum. A sitz bath helps increase blood flow to these areas and relax the muscles.  A sitz bath may be done to:    Help ease pain and itching from hemorrhoids    Help ease pain from an anal fissure    Bathe and soothe the perineum after childbirth    Clean and soothe the anal area or perineum after surgery    Ease prostate pain during prostatitis or after a procedure    Help ease period cramps    Clean the anal and genital areas if you can t take a bath or shower  How a sitz bath is done  A sitz bath can be done in 2 ways: in a bathtub or using a sitz bath bowl.  In a bathtub  To take a sitz bath in a tub:    Make sure your bathtub is clean. Fill a clean bathtub with 3 to 4 inches of warm water. In some cases, your healthcare provider may tell you to use cold water instead.    Add salt or medicine to the water if advised by your healthcare provider.    Gently lower yourself down into the bathtub and sit on the bottom of the tub. Don t get into the bath unless the water temperature is comfortable.    Hold on to a railing. Or ask for help from a family member, friend, or caregiver if needed.  If you have a wound, the water may cause pain at first. But the pain should ease. Make sure the area that needs treating is under the water. You can bend your knees up to help expose the area that needs contact with the water.  Using a sitz bath bowl  A sitz bath bowl is a special plastic container that s placed on a toilet. You can buy this in many drugstores and medical  supply stores. To take a sitz bath this way:    Lift the toilet lid and seat. Place a cleaned plastic sitz bath bowl on the rim of your toilet. Make sure the bowl is firmly in place and won t move around.    Fill the sitz bath bowl with warm water from a pitcher or other container. The water should cover your perineum. Make sure the water temperature is comfortable.    Add salt or medicine to the water if advised by your healthcare provider. Or follow the package instructions about how to fill the bowl. Some kits come with a plastic bag and tubing. This lets you stream water into the bowl and at the area of your body that needs treatment. The bowl may have a slot or hole in the back. This lets water flow out so it doesn t overflow onto the floor. If there is no hole, be careful not to fill the bowl too full.    Gently sit down on the sitz bath bowl. Hold on to a railing. Or ask for help from a family member, friend, or caregiver if needed.  If you have a wound, the water may cause pain at first, but the pain should ease. Make sure the area that needs treating is under the water.  For any type of sitz bath:  1. Sit in the water for 10 to 20 minutes.  2. Add more warm water as needed to keep the water comfortable.  3. Get up slowly from the tub or toilet. You may feel lightheaded or dizzy. Hold on to a railing. Or ask for help from a family member, friend, or caregiver if needed.  4. Gently pat your anal area, perineum, and genitals dry with a clean towel. Don t rub the area.  5. Wash your hands. Put any ointment or cream on the area, if advised.  6. Wash the bathtub or sitz bath bowl with soap and water after each use.  7. Use a sitz bath 2 to 3 times a day, or as often as your healthcare provider advises.  When to call your healthcare provider  Call your healthcare provider right away if you have any of these:    Fever of 100.4 F (38 C) or higher, or as directed    Redness, swelling, or fluid leaking from a cut  (incision) that gets worse    Pain that gets worse    Symptoms that don t get better, or get worse    New symptoms  Date Last Reviewed: 5/1/2016 2000-2019 [employer]. [address], [place] [address]. All rights reserved. This information is not intended as a substitute for professional medical care. Always follow your healthcare professional's instructions.

## 2020-02-05 NOTE — PROGRESS NOTES
Subjective     Ever Garnica is a 54 year old male who presents to clinic today for the following health issues:    HPI     Patient has had URI symptoms which appear to have improved except for a persistent paroxysmal nonproductive cough.  No relief with over-the-counter cough suppressants.    Also concerned about bright red blood on the toilet paper after bowel movements.  Denies and no rectal pain.      Past Medical History:   Diagnosis Date     Genital herpes 2015    buttock paresthesia, urine symptoms     H/O colonoscopy 2016    nl     Hypothyroid 8/15    not on meds     Seasonal allergies        Review of Systems   Constitutional: Negative for chills, fatigue, fever and unexpected weight change.   HENT: Negative for congestion, postnasal drip, sinus pressure, sinus pain and sore throat.    Respiratory: Positive for cough. Negative for shortness of breath.    Cardiovascular: Negative for chest pain.   Gastrointestinal: Negative for abdominal pain, constipation, diarrhea, nausea and vomiting.       /69 (BP Location: Right arm, Patient Position: Sitting, Cuff Size: Adult Regular)   Pulse 59   Temp 97.5  F (36.4  C) (Tympanic)   Ht 1.829 m (6')   Wt 93.1 kg (205 lb 3.2 oz)   SpO2 96%   BMI 27.83 kg/m        Physical Exam  Vitals signs reviewed.   Constitutional:       Appearance: He is not ill-appearing.   Cardiovascular:      Rate and Rhythm: Normal rate and regular rhythm.      Heart sounds: Normal heart sounds.   Pulmonary:      Effort: Pulmonary effort is normal. No respiratory distress.      Breath sounds: Normal breath sounds.   Neurological:      Mental Status: He is alert and oriented to person, place, and time.         ICD-10-CM    1. post-viral bronchitis cough J20.8 guaiFENesin-codeine (ROBITUSSIN AC) 100-10 MG/5ML solution     benzonatate (TESSALON) 100 MG capsule     methylPREDNISolone (MEDROL DOSEPAK) 4 MG tablet therapy pack   2. External hemorrhoids K64.4 See patient instructions below        Patient Instructions     Take Robitussin with codeine at bedtime to help suppress your cough during sleep.    Take Tessalon during the daytime for cough suppression.    If after 2 to 3 days of taking the medications, your cough continues to be bothersome then you may start taking the steroid pack.    Call doctor if you develop any side effects from the medication/s.    Follow printed guidelines for hemorrhoid care/prevention.    Seek immediate medical attention if you develop fever, chest pain, worsening phlegmy cough, shortness of breath, vomiting, diarrhea, rectal/anal pain, increasing blood in your stools.      Patient Education     Hemorrhoids    Hemorrhoids are swollen and inflamed veins inside the rectum and near the anus. The rectum is the last several inches of the colon. The anus is the passage between the rectum and the outside of the body.  Causes  The veins can become swollen due to increased pressure in them. This is most often caused by:    Chronic constipation or diarrhea    Straining when having a bowel movement    Sitting too long on the toilet    A low-fiber diet    Pregnancy  Symptoms    Bleeding from the rectum (this may be noticeable after bowel movements)    Lump near the anus    Itching around the anus    Pain around the anus  There are different types of hemorrhoids. Depending on the type you have and the severity, you may be able to treat yourself at home. In some cases, a procedure may be the best treatment option. Your healthcare provider can tell you more about this, if needed.  Home care  General care    To get relief from pain or itching, try:  ? Medicines. Your healthcare provider may recommend stool softeners, suppositories, or laxatives to help manage constipation. Use these exactly as directed.  ? Sitz baths. A sitz bath involves sitting in a few inches of warm bath water. Be careful not to make the water so hot that you burn yourself--test it before sitting in it. Soak for  about 10 to 15 minutes a few times a day. This may help relieve pain.  ? Topical products. Your healthcare provider may prescribe or recommend creams, ointments, or pads that can be applied to the hemorrhoid. Use these exactly as directed.  Tips to help prevent hemorrhoids    Eat more fiber. Fiber adds bulk to stool and absorbs water as it moves through your colon. This makes stool softer and easier to pass.  ? Increase the fiber in your diet with more fiber-rich foods. These include fresh fruit, vegetables, and whole grains.  ? Take a fiber supplement or bulking agent, if advised by your healthcare provider. These include products such as psyllium or methylcellulose.    Drink more water. Your healthcare provider may direct you to drink plenty of water. This can help keep stool soft.    Be more active. Frequent exercise aids digestion and helps prevent constipation. It may also help make bowel movements more regular.    Don t strain during bowel movements. This can make hemorrhoids more likely. Also, don t sit on the toilet for long periods of time.  Follow-up care  Follow up with your healthcare provider as advised. If a culture or imaging tests were done, someone will let you know the results when they are ready. This may take a few days or longer. If your healthcare provider recommends a procedure for your hemorrhoids, these options can be discussed. Options may include surgery and outpatient office treatments.  When to seek medical advice  Call your healthcare provider right away if any of these occur:    Increased bleeding from the rectum    Increased pain around the rectum or anus    Weakness or dizziness  Call 911  Call 911 if any of these occur:    Trouble breathing or swallowing    Fainting or loss of consciousness    Unusually fast heart rate    Vomiting blood    Large amounts of blood in stool or black, tarry stools  Date Last Reviewed: 9/1/2017 2000-2019 [employer]. [address], [place] [address]. All rights reserved. This information is not intended as a substitute for professional medical care. Always follow your healthcare professional's instructions.           Patient Education     Understanding Hemorrhoids    Hemorrhoid tissues are  cushions  of blood vessels that swell slightly during bowel movements. Too much pressure on the anal canal can make these tissues remain enlarged, become inflamed, and cause symptoms. This can happen both inside and outside the anal canal.  Parts of the anal canal  The parts of the anal canal are:    Internal hemorrhoid tissue is in the upper area of the anal canal.    The rectum is the last several inches of the colon. This is where stool is stored prior to bowel movements.    Anal sphincters are ring-shaped muscles that expand and contract to control the anal opening.    External hemorrhoid tissue lies under the anal skin.    The anus is the passage between the rectum and the outside of the body.  Normal hemorrhoid tissue  Hemorrhoid tissues play an important role in helping your body eliminate waste. Food passes from the stomach through the intestines. The waste (stool) then travels through the colon to the rectum. It is stored in the rectum until it s ready to be passed from the anus. During bowel movements, hemorrhoids swell with blood and become slightly larger. This swelling helps protect and cushion the anal canal as stool passes from the body. Once the stool has passed, the tissues stop swelling and return to normal.  Problem hemorrhoids  Pressure due to straining or other factors can cause hemorrhoid tissues to remain swollen. When this happens to the hemorrhoid tissues in the anal canal they re called internal hemorrhoids. Swollen tissues around the anal opening are called external hemorrhoids. Depending on the location, your symptoms can differ.    Internal hemorrhoids often happen in clusters around the wall of the anal canal. They are  usually painless. But they may prolapse (protrude out of the anus) due to straining or pressure from hard stool. After the bowel movement is over, they may then reduce (return inside the body). Internal hemorrhoids often bleed. They can also discharge mucus.      External hemorrhoids are located at the anal opening, just beneath the skin. These tissues rarely cause problems unless they thrombose (form a blood clot). When this happens, a hard, bluish lump may appear. A thrombosed hemorrhoid also causes sudden, severe pain. In time, the clot may go away on its own. This sometimes leaves a  skin tag  of tissue stretched by the clot.  Hemorrhoid symptoms  Hemorrhoid symptoms may include:    Pain or a burning sensation    Bleeding during bowel movements    Protrusion of tissue from the anus    Itching around the anus  Causes of hemorrhoids  There s no single cause of hemorrhoids. Most often, though, they are caused by too much pressure on the anal canal. This can be due to:    Chronic (ongoing) constipation    Straining during bowel movements    Sitting too long on the toilet    Strenuous exercise or heavy lifting    Pregnancy and childbirth    Aging    Diarrhea  Date Last Reviewed: 7/1/2016 2000-2019 [employer]. [address] [place] [address]. All rights reserved. This information is not intended as a substitute for professional medical care. Always follow your healthcare professional's instructions.           Patient Education     Treating Hemorrhoids: Self-Care    Follow your healthcare provider s advice about caring for your hemorrhoids at home. Some treatments help relieve symptoms right away. Others involve making changes in your diet and exercise habits. These can help ease constipation and prevent hemorrhoid symptoms from coming back.  Relieving symptoms  Your healthcare provider may prescribe anti-inflammatory medicine to help ease your symptoms. The following tips will also help  relieve pain and swelling.    Take sitz baths. Taking a sitz bath means sitting in a few inches of warm bath water. Soaking for 10 minutes twice a day can provide welcome relief from painful hemorrhoids. It can also help the area stay clean.    Develop good bowel habits. Use the bathroom when you need to. Don t ignore the urge to move your bowels. This can lead to constipation, hard stools, and straining. Also, don t read while on the toilet. Sit only as long as needed. Wipe gently with soft, unscented toilet tissue or baby wipes.    Use ice packs. Placing an ice pack on a thrombosed external hemorrhoid can help relieve pain right away. It will also help reduce the blood clot. Use the ice for 15 to 20 minutes at a time. Keep a cloth between the ice and your skin to prevent skin damage.    Use other measures. Laxatives and enemas can help ease constipation. But use them only on your healthcare provider s advice. For symptom relief, try using cotton pads soaked in witch hazel. These are available at most drugstores. Over-the-counter hemorrhoid ointments and petroleum jelly can also provide relief.  Add fiber to your diet  Adding fiber to your diet can help relieve constipation by making stools softer and easier to pass. To increase your fiber intake, your healthcare provider may recommend a bulking agent, such as psyllium. This is a high-fiber supplement available at most grocery stores and drugstores. Eating more fiber-rich foods will also help. There are two types of fiber:    Insoluble fiber is the main ingredient in bulking agents. It s also found in foods such as wheat bran, whole-grain breads, fresh fruits, and vegetables.    Soluble fiber is found in foods such as oat bran. Although soluble fiber is good for you, it may not ease constipation as much as foods high in insoluble fiber.  Drink more water  Along with a high-fiber diet, drinking more water can help ease constipation. This is because insoluble fiber  absorbs water, making stools soft and bulky. Be sure to drink plenty of water throughout the day. Drinking fruit juices, such as prune juice or apple juice, can also help prevent constipation.  Get more exercise  Regular exercise aids digestion and helps prevent constipation. It s also great for your health. So talk with your healthcare provider about starting an exercise program. Low-impact activities, such as swimming or walking, are good places to start. Take it easy at first. And remember to drink plenty of water when you exercise.  High-fiber foods  High-fiber foods offer many benefits. By making your stools softer, they help heal and prevent swollen hemorrhoids. They may also help reduce the risk of colon and rectal cancer. Best of all, they re usually low in calories and taste great. Here are some examples of fiber-rich foods.    Whole grains, such as wheat bran, corn bran, and brown rice.    Vegetables, especially carrots, broccoli, cabbage, and peas.    Fruits, such as apples, bananas, raisins, peaches, and pears.    Nuts and legumes, especially peanuts, lentils, and kidney beans.  Easy ways to add fiber  The tips below offer some simple ways to add more high-fiber foods to your meals.    Start your day with a high-fiber breakfast. Eat a wheat bran cereal along with a sliced banana. Or, try peanut butter on whole-wheat toast.    Eat carrot sticks for snacks. They re easy to prepare, taste great, and are low in calories.    Use whole-grain breads instead of white bread for sandwiches.    Eat fruits for treats. Try an apple and some raisins instead of a candy bar.   Date Last Reviewed: 7/1/2016 2000-2019 [employer]. [address] [place] [address], [place] [address]. All rights reserved. This information is not intended as a substitute for professional medical care. Always follow your healthcare professional's instructions.           Patient Education     Taking a Sitz Bath  A sitz bath is a type of  therapy done by sitting in warm, shallow water. It can help soothe pain, itching, and other symptoms in the anal and genital areas. It can also help keep these areas clean if you can t take a bath or shower. Sitz is from the Romanian word sitzen, which means to sit.  Why a sitz bath is done  A sitz bath helps clean and treat certain problems in the anal area, genital area, and the perineum. The perineum is the area between the anus and the vulva in women. In men, it is between the anus and the scrotum. A sitz bath helps increase blood flow to these areas and relax the muscles.  A sitz bath may be done to:    Help ease pain and itching from hemorrhoids    Help ease pain from an anal fissure    Bathe and soothe the perineum after childbirth    Clean and soothe the anal area or perineum after surgery    Ease prostate pain during prostatitis or after a procedure    Help ease period cramps    Clean the anal and genital areas if you can t take a bath or shower  How a sitz bath is done  A sitz bath can be done in 2 ways: in a bathtub or using a sitz bath bowl.  In a bathtub  To take a sitz bath in a tub:    Make sure your bathtub is clean. Fill a clean bathtub with 3 to 4 inches of warm water. In some cases, your healthcare provider may tell you to use cold water instead.    Add salt or medicine to the water if advised by your healthcare provider.    Gently lower yourself down into the bathtub and sit on the bottom of the tub. Don t get into the bath unless the water temperature is comfortable.    Hold on to a railing. Or ask for help from a family member, friend, or caregiver if needed.  If you have a wound, the water may cause pain at first. But the pain should ease. Make sure the area that needs treating is under the water. You can bend your knees up to help expose the area that needs contact with the water.  Using a sitz bath bowl  A sitz bath bowl is a special plastic container that s placed on a toilet. You can buy  this in many drugstores and medical supply stores. To take a sitz bath this way:    Lift the toilet lid and seat. Place a cleaned plastic sitz bath bowl on the rim of your toilet. Make sure the bowl is firmly in place and won t move around.    Fill the sitz bath bowl with warm water from a pitcher or other container. The water should cover your perineum. Make sure the water temperature is comfortable.    Add salt or medicine to the water if advised by your healthcare provider. Or follow the package instructions about how to fill the bowl. Some kits come with a plastic bag and tubing. This lets you stream water into the bowl and at the area of your body that needs treatment. The bowl may have a slot or hole in the back. This lets water flow out so it doesn t overflow onto the floor. If there is no hole, be careful not to fill the bowl too full.    Gently sit down on the sitz bath bowl. Hold on to a railing. Or ask for help from a family member, friend, or caregiver if needed.  If you have a wound, the water may cause pain at first, but the pain should ease. Make sure the area that needs treating is under the water.  For any type of sitz bath:  1. Sit in the water for 10 to 20 minutes.  2. Add more warm water as needed to keep the water comfortable.  3. Get up slowly from the tub or toilet. You may feel lightheaded or dizzy. Hold on to a railing. Or ask for help from a family member, friend, or caregiver if needed.  4. Gently pat your anal area, perineum, and genitals dry with a clean towel. Don t rub the area.  5. Wash your hands. Put any ointment or cream on the area, if advised.  6. Wash the bathtub or sitz bath bowl with soap and water after each use.  7. Use a sitz bath 2 to 3 times a day, or as often as your healthcare provider advises.  When to call your healthcare provider  Call your healthcare provider right away if you have any of these:    Fever of 100.4 F (38 C) or higher, or as directed    Redness,  swelling, or fluid leaking from a cut (incision) that gets worse    Pain that gets worse    Symptoms that don t get better, or get worse    New symptoms  Date Last Reviewed: 5/1/2016 2000-2019 [employer]. [address] [place] [address], [place] [address]. All rights reserved. This information is not intended as a substitute for professional medical care. Always follow your healthcare professional's instructions.

## 2020-02-10 ASSESSMENT — ENCOUNTER SYMPTOMS
CONSTIPATION: 0
UNEXPECTED WEIGHT CHANGE: 0
SHORTNESS OF BREATH: 0
COUGH: 1
SINUS PRESSURE: 0
CHILLS: 0
VOMITING: 0
ABDOMINAL PAIN: 0
NAUSEA: 0
DIARRHEA: 0
FATIGUE: 0
SINUS PAIN: 0
FEVER: 0
SORE THROAT: 0

## 2020-11-07 ENCOUNTER — HEALTH MAINTENANCE LETTER (OUTPATIENT)
Age: 55
End: 2020-11-07

## 2021-01-15 ENCOUNTER — HEALTH MAINTENANCE LETTER (OUTPATIENT)
Age: 56
End: 2021-01-15

## 2021-03-30 ENCOUNTER — IMMUNIZATION (OUTPATIENT)
Dept: PEDIATRICS | Facility: CLINIC | Age: 56
End: 2021-03-30
Payer: COMMERCIAL

## 2021-03-30 PROCEDURE — 0001A PR COVID VAC PFIZER DIL RECON 30 MCG/0.3 ML IM: CPT

## 2021-03-30 PROCEDURE — 91300 PR COVID VAC PFIZER DIL RECON 30 MCG/0.3 ML IM: CPT

## 2021-04-20 ENCOUNTER — IMMUNIZATION (OUTPATIENT)
Dept: PEDIATRICS | Facility: CLINIC | Age: 56
End: 2021-04-20
Attending: INTERNAL MEDICINE
Payer: COMMERCIAL

## 2021-04-20 PROCEDURE — 91300 PR COVID VAC PFIZER DIL RECON 30 MCG/0.3 ML IM: CPT

## 2021-04-20 PROCEDURE — 0002A PR COVID VAC PFIZER DIL RECON 30 MCG/0.3 ML IM: CPT

## 2021-07-23 ENCOUNTER — OFFICE VISIT (OUTPATIENT)
Dept: FAMILY MEDICINE | Facility: CLINIC | Age: 56
End: 2021-07-23
Payer: COMMERCIAL

## 2021-07-23 VITALS
HEIGHT: 72 IN | OXYGEN SATURATION: 99 % | DIASTOLIC BLOOD PRESSURE: 71 MMHG | HEART RATE: 58 BPM | WEIGHT: 196 LBS | TEMPERATURE: 97.7 F | SYSTOLIC BLOOD PRESSURE: 106 MMHG | BODY MASS INDEX: 26.55 KG/M2

## 2021-07-23 DIAGNOSIS — M25.559 HIP PAIN: ICD-10-CM

## 2021-07-23 DIAGNOSIS — R21 RASH AND NONSPECIFIC SKIN ERUPTION: ICD-10-CM

## 2021-07-23 DIAGNOSIS — R42 LIGHT HEADED: ICD-10-CM

## 2021-07-23 DIAGNOSIS — E03.9 HYPOTHYROIDISM, UNSPECIFIED TYPE: ICD-10-CM

## 2021-07-23 DIAGNOSIS — H92.01 EAR DISCOMFORT, RIGHT: ICD-10-CM

## 2021-07-23 DIAGNOSIS — A60.00 HERPES SIMPLEX INFECTION OF GENITOURINARY SYSTEM: ICD-10-CM

## 2021-07-23 DIAGNOSIS — Z00.00 ENCOUNTER FOR ANNUAL PHYSICAL EXAM: Primary | ICD-10-CM

## 2021-07-23 LAB
ERYTHROCYTE [DISTWIDTH] IN BLOOD BY AUTOMATED COUNT: 13.1 % (ref 10–15)
HCT VFR BLD AUTO: 41 % (ref 40–53)
HGB BLD-MCNC: 14.6 G/DL (ref 13.3–17.7)
MCH RBC QN AUTO: 31.6 PG (ref 26.5–33)
MCHC RBC AUTO-ENTMCNC: 35.6 G/DL (ref 31.5–36.5)
MCV RBC AUTO: 89 FL (ref 78–100)
PLATELET # BLD AUTO: 218 10E3/UL (ref 150–450)
RBC # BLD AUTO: 4.62 10E6/UL (ref 4.4–5.9)
WBC # BLD AUTO: 4.5 10E3/UL (ref 4–11)

## 2021-07-23 PROCEDURE — 99396 PREV VISIT EST AGE 40-64: CPT | Performed by: INTERNAL MEDICINE

## 2021-07-23 PROCEDURE — 84443 ASSAY THYROID STIM HORMONE: CPT | Performed by: INTERNAL MEDICINE

## 2021-07-23 PROCEDURE — G0103 PSA SCREENING: HCPCS | Performed by: INTERNAL MEDICINE

## 2021-07-23 PROCEDURE — 85027 COMPLETE CBC AUTOMATED: CPT | Performed by: INTERNAL MEDICINE

## 2021-07-23 PROCEDURE — 36415 COLL VENOUS BLD VENIPUNCTURE: CPT | Performed by: INTERNAL MEDICINE

## 2021-07-23 PROCEDURE — 80053 COMPREHEN METABOLIC PANEL: CPT | Performed by: INTERNAL MEDICINE

## 2021-07-23 PROCEDURE — 80061 LIPID PANEL: CPT | Performed by: INTERNAL MEDICINE

## 2021-07-23 ASSESSMENT — MIFFLIN-ST. JEOR: SCORE: 1762.05

## 2021-07-23 NOTE — PROGRESS NOTES
SUBJECTIVE:   CC: Ever Garnica is an 55 year old male who presents for preventative health visit.     Overall he is doing well and works out regularly.  He is  but has had a girlfriend for 4 years.  He has 3 kids ages 13-18.  One he notes that if he is on long car rides and stands he can get a bit lightheaded for short period of time.  Will be going to [employer] next year.    He notes that if he goes on long car rides and then stands he can get a little bit lightheaded for short amount of time.  It resolves quickly.  It does not happen at other times.  No syncope.    In the winter he was having hip pain bilaterally, lateral sides.  This is subsequently resolved.  It seems to be after snowshoeing.    He occasionally can get pimple-like lesions in the top of his scalp.  This is been for years.    He was having some right ear discomfort but this is mostly resolved.    He has a history of herpes simplex but no recent outbreaks.      Patient has been advised of split billing requirements and indicates understanding: Yes  Healthy Habits:    Getting at least 3 servings of Calcium per day:  Yes    Bi-annual eye exam:  NO    Dental care twice a year:  Yes    Sleep apnea or symptoms of sleep apnea:  None    Diet:  Regular (no restrictions)    Frequency of exercise:  6-7 days/week    Duration of exercise:  45-60 minutes    Taking medications regularly:  Not Applicable    Barriers to taking medications:  Not applicable    Medication side effects:  Not applicable    PHQ-2 Total Score:    Additional concerns today:  No              Today's PHQ-2 Score:   PHQ-2 ( 1999 [employer]) 2/5/2020   Q1: Little interest or pleasure in doing things 0   Q2: Feeling down, depressed or hopeless 0   PHQ-2 Score 0       Abuse: Current or Past(Physical, Sexual or Emotional)- No  Do you feel safe in your environment? Yes    Have you ever done Advance Care Planning? (For example, a Health Directive, POLST, or a discussion with a  medical provider or your loved ones about your wishes): No, advance care planning information given to patient to review.  Patient plans to discuss their wishes with loved ones or provider.                  Past Medical History:      Past Medical History:   Diagnosis Date     Genital herpes 2015    buttock paresthesia, urine symptoms     H/O colonoscopy 2016    nl     Hypothyroid 8/15    not on meds     Seasonal allergies              Past Surgical History:      Past Surgical History:   Procedure Laterality Date     C ANESTH,REPAIR LO ABD HERNIA NOS      age less than 10 years     clavicular surgery for fx  2013     COLONOSCOPY N/A 3/10/2016    Procedure: COLONOSCOPY;  Surgeon: Frank Ayala MD;  Location:  GI     HERNIA REPAIR  2005    bilat     VASECTOMY  10/2008             Social History:     Social History     Socioeconomic History     Marital status:      Spouse name: Not on file     Number of children: 3     Years of education: Not on file     Highest education level: Not on file   Occupational History     Occupation: , Nativoometician     Employer: [employer]   Tobacco Use     Smoking status: Never Smoker     Smokeless tobacco: Never Used   Substance and Sexual Activity     Alcohol use: No     Alcohol/week: 0.0 standard drinks     Comment: 0     Drug use: No     Sexual activity: Yes     Partners: Female   Other Topics Concern      Service No     Blood Transfusions No     Caffeine Concern Not Asked     Occupational Exposure Not Asked     Hobby Hazards Not Asked     Sleep Concern Not Asked     Stress Concern Not Asked     Weight Concern Not Asked     Special Diet Not Asked     Back Care Not Asked     Exercise No     Comment: not scheduled     Bike Helmet Not Asked     Seat Belt Yes     Self-Exams Not Asked     Parent/sibling w/ CABG, MI or angioplasty before 65F 55M? Not Asked   Social History Narrative     Not on file     Social Determinants of Health     Financial Resource  Strain:      Difficulty of Paying Living Expenses:    Food Insecurity:      Worried About Running Out of Food in the Last Year:      Ran Out of Food in the Last Year:    Transportation Needs:      Lack of Transportation (Medical):      Lack of Transportation (Non-Medical):    Physical Activity:      Days of Exercise per Week:      Minutes of Exercise per Session:    Stress:      Feeling of Stress :    Social Connections:      Frequency of Communication with Friends and Family:      Frequency of Social Gatherings with Friends and Family:      Attends Denominational Services:      Active Member of Clubs or Organizations:      Attends Club or Organization Meetings:      Marital Status:    Intimate Partner Violence:      Fear of Current or Ex-Partner:      Emotionally Abused:      Physically Abused:      Sexually Abused:              Family History:   reviewed         Allergies:   No Known Allergies          Medications:     No current outpatient medications on file.               Review of Systems:   The 10 point Review of Systems is negative other than noted in the HPI           Physical Exam:   Blood pressure 106/71, pulse 58, temperature 97.7  F (36.5  C), temperature source Temporal, height 1.829 m (6'), weight 88.9 kg (196 lb), SpO2 99 %.  Patient's blood pressure for me same lying, standing and seated  Exam:  Constitutional: healthy appearing, alert and in no distress  Heent: Normocephalic. Head without obvious masses or lesions. PERRLDC, EOMI. Mouth exam within normal limits: tongue, mucous membranes, posterior pharynx all normal, no lesions or abnormalities seen.  Tm's and canals within normal limits bilaterally. Neck supple, no nuchal rigidity or masses. No supraclavicular, or cervical adenopathy. Thyroid symmetric, no masses.  Cardiovascular: Regular rate and rhythm, no murmer, rub or gallops.  JVP not elevated, no edema.  Carotids within normal limits bilaterally, no bruits.  Respiratory: Normal respiratory  effort.  Lungs clear, normal flow, no wheezing or crackles.  Breasts: Normal bilaterally.  No masses or lesions.  Nipples within normal limites.  No axillary lesions or nodes.  Gastrointestinal: Normal active bowel sounds.   Soft, not tender, no masses, guarding or rebound.  No hepatosplenomegaly.   Genitourinary: Rectal no masses, min bph  Musculoskeletal: extremities normal, no gross deformities noted.  Skin: no suspicious lesions or rashes   Neurologic: Mental status within normal limits.  Speech fluent.  No gross motor abnormalities and gait intact.  Psychiatric: mentation appears normal and affect normal.         Data:   Labs sent        Assessment:   1. Normal complete physical exam  2. Light headed, neg exam, suspect pooling of blood, doubt other cause, call if worsens, get up slow  3. Hip pain, follow up ortho prn  4. Scalp lesions, nothing today  5. Ear discomfort, neg exam  6. Herpes, no issues, I discussed with the patient that he can be infectious even without any visible outbreak.  7.  Hypothyroidism         Plan:   shingrix at pharm  Up to date colon  Exercise, diet  Follow up ortho prn  Letter with labs  Get up slowly after long car rides      Frank Elizabeth M.D.    ,m1

## 2021-07-23 NOTE — LETTER
July 26, 2021      Ever Garnica  [address] [id number]        Dear ,    It was a pleasure seeing you for your physical examination.  I wanted to get back to you with your test results.  I have enclosed a copy for your review.       I am happy to report that your cbc or complete blood count is normal with no signs of anemia, leukemia or platelet abnormalities.  Your chemistry panel shows no signs of diabetes.  Your blood salts, kidney tests, liver tests, psa, and proteins are all fine.       Your tsh or thyroid test is barely off and in fact better than last year.  Nothing needs to be done about this but we should check it yearly.     Your total cholesterol is 202 with the normal range being below 200.  Your HDL or good cholesterol is 57 with the normal range being above 40.  Your LDL or bad cholesterol is 133 with the normal range being below 130.  Overall these numbers are fine.     I am happy to bring you this overall excellent report.  Please be sure to exercise and eat a healthy diet.  If you have any questions please call me.        Resulted Orders   CBC with platelets   Result Value Ref Range    WBC Count 4.5 4.0 - 11.0 10e3/uL    RBC Count 4.62 4.40 - 5.90 10e6/uL    Hemoglobin 14.6 13.3 - 17.7 g/dL    Hematocrit 41.0 40.0 - 53.0 %    MCV 89 78 - 100 fL    MCH 31.6 26.5 - 33.0 pg    MCHC 35.6 31.5 - 36.5 g/dL    RDW 13.1 10.0 - 15.0 %    Platelet Count 218 150 - 450 10e3/uL   Comprehensive metabolic panel   Result Value Ref Range    Sodium 139 133 - 144 mmol/L    Potassium 4.1 3.4 - 5.3 mmol/L    Chloride 108 94 - 109 mmol/L    Carbon Dioxide (CO2) 23 20 - 32 mmol/L    Anion Gap 8 3 - 14 mmol/L    Urea Nitrogen 14 7 - 30 mg/dL    Creatinine 1.09 0.66 - 1.25 mg/dL    Calcium 8.5 8.5 - 10.1 mg/dL    Glucose 86 70 - 99 mg/dL    Alkaline Phosphatase 75 40 - 150 U/L    AST 26 0 - 45 U/L    ALT 15 0 - 70 U/L    Protein Total 7.1 6.8 - 8.8 g/dL    Albumin 3.6 3.4 - 5.0 g/dL     Bilirubin Total 0.5 0.2 - 1.3 mg/dL    GFR Estimate 76 >60 mL/min/1.73m2      Comment:      As of July 11, 2021, eGFR is calculated by the CKD-EPI creatinine equation, without race adjustment. eGFR can be influenced by muscle mass, exercise, and diet. The reported eGFR is an estimation only and is only applicable if the renal function is stable.   Lipid Profile   Result Value Ref Range    Cholesterol 202 (H) <200 mg/dL      Comment:      Age 0-19 years  Desirable: <170 mg/dL  Borderline high:  170-199 mg/dl  High:            >199 mg/dl    Age 20 years and older  Desirable: <200 mg/dL    Triglycerides 62 <150 mg/dL      Comment:      0-9 years:  Normal:    Less than 75 mg/dL  Borderline high:  75-99 mg/dL  High:             Greater than or equal to 100 mg/dL    0-19 years:  Normal:    Less than 90 mg/dL  Borderline high:   mg/dL  High:             Greater than or equal to 130 mg/dL    20 years and older:  Normal:    Less than 150 mg/dL  Borderline high:  150-199 mg/dL  High:             200-499 mg/dL  Very high:   Greater than or equal to 500 mg/dL    Direct Measure HDL 57 >=40 mg/dL      Comment:      0-19 years:       Greater than or equal to 45 mg/dL   Low: Less than 40 mg/dL   Borderline low: 40-44 mg/dL     20 years and older:   Female: Greater than or equal to 50 mg/dL   Male:   Greater than or equal to 40 mg/dL         LDL Cholesterol Calculated 133 (H) <=100 mg/dL      Comment:      Age 0-19 years:  Desirable: 0-110 mg/dL   Borderline high: 110-129 mg/dL   High: >= 130 mg/dL    Age 20 years and older:  Desirable: <100mg/dL  Above desirable: 100-129 mg/dL   Borderline high: 130-159 mg/dL   High: 160-189 mg/dL   Very high: >= 190 mg/dL    Non HDL Cholesterol 145 (H) <130 mg/dL      Comment:      0-19 years:  Desirable:          Less than 120 mg/dL  Borderline high:   120-144 mg/dL  High:                   Greater than or equal to 145 mg/dL    20 years and older:  Desirable:          130 mg/dL  Above  Desirable: 130-159 mg/dL  Borderline high:   160-189 mg/dL  High:               190-219 mg/dL  Very high:     Greater than or equal to 220 mg/dL    Patient Fasting > 8hrs? Yes    TSH   Result Value Ref Range    TSH 6.06 (H) 0.40 - 4.00 mU/L   Prostate Specific Antigen Screen   Result Value Ref Range    Prostate Specific Antigen Screen 0.64 0.00 - 4.00 ug/L       If you have any questions or concerns, please call the clinic at the number listed above.       Sincerely,      Frank Elizabeth MD    [email or web address]

## 2021-07-24 LAB
ALBUMIN SERPL-MCNC: 3.6 G/DL (ref 3.4–5)
ALP SERPL-CCNC: 75 U/L (ref 40–150)
ALT SERPL W P-5'-P-CCNC: 15 U/L (ref 0–70)
ANION GAP SERPL CALCULATED.3IONS-SCNC: 8 MMOL/L (ref 3–14)
AST SERPL W P-5'-P-CCNC: 26 U/L (ref 0–45)
BILIRUB SERPL-MCNC: 0.5 MG/DL (ref 0.2–1.3)
BUN SERPL-MCNC: 14 MG/DL (ref 7–30)
CALCIUM SERPL-MCNC: 8.5 MG/DL (ref 8.5–10.1)
CHLORIDE BLD-SCNC: 108 MMOL/L (ref 94–109)
CHOLEST SERPL-MCNC: 202 MG/DL
CO2 SERPL-SCNC: 23 MMOL/L (ref 20–32)
CREAT SERPL-MCNC: 1.09 MG/DL (ref 0.66–1.25)
FASTING STATUS PATIENT QL REPORTED: YES
GFR SERPL CREATININE-BSD FRML MDRD: 76 ML/MIN/1.73M2
GLUCOSE BLD-MCNC: 86 MG/DL (ref 70–99)
HDLC SERPL-MCNC: 57 MG/DL
LDLC SERPL CALC-MCNC: 133 MG/DL
NONHDLC SERPL-MCNC: 145 MG/DL
POTASSIUM BLD-SCNC: 4.1 MMOL/L (ref 3.4–5.3)
PROT SERPL-MCNC: 7.1 G/DL (ref 6.8–8.8)
PSA SERPL-MCNC: 0.64 UG/L (ref 0–4)
SODIUM SERPL-SCNC: 139 MMOL/L (ref 133–144)
TRIGL SERPL-MCNC: 62 MG/DL
TSH SERPL DL<=0.005 MIU/L-ACNC: 6.06 MU/L (ref 0.4–4)

## 2021-07-25 NOTE — RESULT ENCOUNTER NOTE
It was a pleasure seeing you for your physical examination.  I wanted to get back to you with your test results.  I have enclosed a copy for your review.      I am happy to report that your cbc or complete blood count is normal with no signs of anemia, leukemia or platelet abnormalities.  Your chemistry panel shows no signs of diabetes.  Your blood salts, kidney tests, liver tests, psa, and proteins are all fine.      Your tsh or thyroid test is barely off and in fact better than last year.  Nothing needs to be done about this but we should check it yearly.    Your total cholesterol is 202 with the normal range being below 200.  Your HDL or good cholesterol is 57 with the normal range being above 40.  Your LDL or bad cholesterol is 133 with the normal range being below 130.  Overall these numbers are fine.    I am happy to bring you this overall excellent report.  Please be sure to exercise and eat a healthy diet.  If you have any questions please call me.    Frank Elizabeth M.D.

## 2021-09-11 ENCOUNTER — HEALTH MAINTENANCE LETTER (OUTPATIENT)
Age: 56
End: 2021-09-11

## 2022-01-25 ENCOUNTER — OFFICE VISIT (OUTPATIENT)
Dept: FAMILY MEDICINE | Facility: CLINIC | Age: 57
End: 2022-01-25
Payer: COMMERCIAL

## 2022-01-25 VITALS
HEART RATE: 64 BPM | WEIGHT: 195 LBS | DIASTOLIC BLOOD PRESSURE: 70 MMHG | TEMPERATURE: 97.3 F | SYSTOLIC BLOOD PRESSURE: 114 MMHG | BODY MASS INDEX: 26.45 KG/M2 | OXYGEN SATURATION: 99 %

## 2022-01-25 DIAGNOSIS — S80.01XS CONTUSION OF RIGHT KNEE, SEQUELA: Primary | ICD-10-CM

## 2022-01-25 PROCEDURE — 90750 HZV VACC RECOMBINANT IM: CPT | Performed by: FAMILY MEDICINE

## 2022-01-25 PROCEDURE — 90471 IMMUNIZATION ADMIN: CPT | Performed by: FAMILY MEDICINE

## 2022-01-25 PROCEDURE — 99214 OFFICE O/P EST MOD 30 MIN: CPT | Mod: 25 | Performed by: FAMILY MEDICINE

## 2022-01-25 ASSESSMENT — PAIN SCALES - GENERAL: PAINLEVEL: NO PAIN (0)

## 2022-01-25 NOTE — PROGRESS NOTES
Assessment & Plan     Contusion of right knee, sequela  Patient most likely has right knee contusion.  His clinical exam is normal there is no sign of any acute injury.  Suggested continue range of motion exercises if there is any acute swelling associated with that or any pain which does not continue to improve he will let us know.  Discussed MRI for further evaluation if continues to have discomfort      6}     BMI:   Estimated body mass index is 26.45 kg/m  as calculated from the following:    Height as of 7/23/21: 1.829 m (6').    Weight as of this encounter: 88.5 kg (195 lb).     Adi Larsen MD  [facility] TREMAINE Curtis is a 56 year old who presents for the following health issues    HPI     Pain History:  When did you first notice your pain? - 1 to 6 weeks   Have you seen any provider previously for this issue? No  How has your pain affected your ability to work? Pain does not limit ability to work   Where in your body do you have pain? Musculoskeletal problem/pain  Onset/Duration: 3+ weeks   Description  Location: knee - right  Joint Swelling: no  Redness: no  Pain: sling pain when kneeling on it   Warmth: no  Intensity:  mild  Progression of Symptoms:  improving  Accompanying signs and symptoms:   Fevers: no  Numbness/tingling/weakness: no  History  Trauma to the area: YES- injured while skiing   Recent illness:  no  Previous similar problem: no  Previous evaluation:  no  Precipitating or alleviating factors:  Aggravating factors include: none  Therapies tried and outcome: ice and Ibuprofen    Patient was able to walk after that has been progressively getting better.  Denies any difficulty walking no swelling.  He is skiing again without any difficulty  Review of Systems   Constitutional, HEENT, cardiovascular, pulmonary, gi and gu systems are negative, except as otherwise noted.      Objective    /70   Pulse 64   Temp 97.3  F (36.3  C) (Tympanic)   Wt 88.5 kg  (195 lb)   SpO2 99%   BMI 26.45 kg/m    Body mass index is 26.45 kg/m .  Physical Exam   GENERAL: healthy, alert and no distress  Knee exam is normal normal range of motion no swelling no joint line tenderness flexion extension is normal

## 2022-09-12 ENCOUNTER — OFFICE VISIT (OUTPATIENT)
Dept: URGENT CARE | Facility: URGENT CARE | Age: 57
End: 2022-09-12
Payer: COMMERCIAL

## 2022-09-12 ENCOUNTER — NURSE TRIAGE (OUTPATIENT)
Dept: NURSING | Facility: CLINIC | Age: 57
End: 2022-09-12

## 2022-09-12 VITALS
BODY MASS INDEX: 26.58 KG/M2 | TEMPERATURE: 98.1 F | OXYGEN SATURATION: 97 % | SYSTOLIC BLOOD PRESSURE: 139 MMHG | DIASTOLIC BLOOD PRESSURE: 87 MMHG | HEART RATE: 59 BPM | WEIGHT: 196 LBS

## 2022-09-12 DIAGNOSIS — R07.81 RIB PAIN ON LEFT SIDE: Primary | ICD-10-CM

## 2022-09-12 PROCEDURE — 99213 OFFICE O/P EST LOW 20 MIN: CPT

## 2022-09-12 RX ORDER — FLUTICASONE PROPIONATE 50 MCG
1 SPRAY, SUSPENSION (ML) NASAL PRN
COMMUNITY

## 2022-09-12 NOTE — TELEPHONE ENCOUNTER
Patient reports he was water skiing yesterday., and feels like he may have injured rib .  He is asking where to go.  He was advised about making an appointment at his clinic or just going to an Urgent Care clinic.    Patient states he will go to [facility] this morning .    Johnna Garland RN   [facility] Nurse Advisor    Reason for Disposition    [1] MODERATE pain (e.g., interferes with normal activities) AND [2] pain is WORSE with breathing    Additional Information    Negative: SEVERE chest or rib pain (e.g., excruciating, unable to do any normal activities)    Negative: Sounds like a serious injury to the triager    Negative: Chest Injury from a direct blow or fall    Negative: Chest injury knife, gun shot, or other penetrating trauma    Negative: Chest injury with cut or laceration    Negative: Chest pain not from an injury    Negative: Sounds like a life-threatening emergency to the triager    Protocols used: CHEST INJURY - BENDING, LIFTING, OR [id number]

## 2022-09-13 NOTE — PROGRESS NOTES
Assessment & Plan     Rib pain on left side    - XR Ribs & Chest Left G/E 3 Views; Future    Patient reassured with negative xrays tonite.  Discussed strain from waterskiing trauma.  Recommend HEAT/ICE for comfort and NSAIDs/Tylenol prn.  Close Follow-up if no change or new or worsening sx prn.    Rosa Elena Read MD   Urgent Care Provider  [facility] KAIA Curtis is a 57 year old, presenting for the following health issues:  Urgent Care      HPI     Was waterskiing yesterday and felt a muscle pull and pain below LEFT nipple in ribcage anteriorly as he was trying to stand up on skis.  Has been hurting more in last 24 hours.  Denies any trauma to area- no falls.      Review of Systems   Constitutional, HEENT, cardiovascular, pulmonary, GI, , musculoskeletal, neuro, skin, endocrine and psych systems are negative, except as otherwise noted.      Objective    Wt 88.9 kg (196 lb)   BMI 26.58 kg/m    Body mass index is 26.58 kg/m .  Physical Exam   GENERAL: healthy, alert and no distress  EYES: Eyes grossly normal to inspection, PERRL and conjunctivae and sclerae normal  MS: no gross musculoskeletal defects noted, no edema, pinpoints pain over LEFT anterior mid-clavicular region 2 fingers breadth below LEFT nipple and slightly medial to nipple.  No bruising or step off noted.  SKIN: no suspicious lesions or rashes  NEURO: Normal strength and tone, mentation intact and speech normal  PSYCH: mentation appears normal, affect normal/bright    Xray - Reviewed and interpreted by me.  Negative for rib fractures.

## 2022-10-12 ENCOUNTER — OFFICE VISIT (OUTPATIENT)
Dept: FAMILY MEDICINE | Facility: CLINIC | Age: 57
End: 2022-10-12
Payer: COMMERCIAL

## 2022-10-12 VITALS
HEART RATE: 63 BPM | SYSTOLIC BLOOD PRESSURE: 99 MMHG | HEIGHT: 72 IN | OXYGEN SATURATION: 96 % | TEMPERATURE: 97.1 F | WEIGHT: 192.2 LBS | BODY MASS INDEX: 26.03 KG/M2 | DIASTOLIC BLOOD PRESSURE: 67 MMHG | RESPIRATION RATE: 16 BRPM

## 2022-10-12 DIAGNOSIS — Z00.00 ANNUAL PHYSICAL EXAM: Primary | ICD-10-CM

## 2022-10-12 DIAGNOSIS — Z12.83 SCREENING FOR SKIN CANCER: ICD-10-CM

## 2022-10-12 DIAGNOSIS — Z12.5 SCREENING FOR PROSTATE CANCER: ICD-10-CM

## 2022-10-12 DIAGNOSIS — Z23 NEED FOR PROPHYLACTIC VACCINATION AND INOCULATION AGAINST INFLUENZA: ICD-10-CM

## 2022-10-12 LAB
BASOPHILS # BLD AUTO: 0 10E3/UL (ref 0–0.2)
BASOPHILS NFR BLD AUTO: 1 %
EOSINOPHIL # BLD AUTO: 0.4 10E3/UL (ref 0–0.7)
EOSINOPHIL NFR BLD AUTO: 6 %
ERYTHROCYTE [DISTWIDTH] IN BLOOD BY AUTOMATED COUNT: 12.6 % (ref 10–15)
HCT VFR BLD AUTO: 43.8 % (ref 40–53)
HGB BLD-MCNC: 15.3 G/DL (ref 13.3–17.7)
IMM GRANULOCYTES # BLD: 0 10E3/UL
IMM GRANULOCYTES NFR BLD: 0 %
LYMPHOCYTES # BLD AUTO: 1.3 10E3/UL (ref 0.8–5.3)
LYMPHOCYTES NFR BLD AUTO: 21 %
MCH RBC QN AUTO: 30.3 PG (ref 26.5–33)
MCHC RBC AUTO-ENTMCNC: 34.9 G/DL (ref 31.5–36.5)
MCV RBC AUTO: 87 FL (ref 78–100)
MONOCYTES # BLD AUTO: 0.6 10E3/UL (ref 0–1.3)
MONOCYTES NFR BLD AUTO: 10 %
NEUTROPHILS # BLD AUTO: 3.9 10E3/UL (ref 1.6–8.3)
NEUTROPHILS NFR BLD AUTO: 63 %
PLATELET # BLD AUTO: 218 10E3/UL (ref 150–450)
RBC # BLD AUTO: 5.05 10E6/UL (ref 4.4–5.9)
WBC # BLD AUTO: 6.2 10E3/UL (ref 4–11)

## 2022-10-12 PROCEDURE — 90682 RIV4 VACC RECOMBINANT DNA IM: CPT | Performed by: PHYSICIAN ASSISTANT

## 2022-10-12 PROCEDURE — 99396 PREV VISIT EST AGE 40-64: CPT | Mod: 25 | Performed by: PHYSICIAN ASSISTANT

## 2022-10-12 PROCEDURE — 36415 COLL VENOUS BLD VENIPUNCTURE: CPT | Performed by: PHYSICIAN ASSISTANT

## 2022-10-12 PROCEDURE — 80050 GENERAL HEALTH PANEL: CPT | Performed by: PHYSICIAN ASSISTANT

## 2022-10-12 PROCEDURE — 80061 LIPID PANEL: CPT | Performed by: PHYSICIAN ASSISTANT

## 2022-10-12 PROCEDURE — 84439 ASSAY OF FREE THYROXINE: CPT | Performed by: PHYSICIAN ASSISTANT

## 2022-10-12 PROCEDURE — G0103 PSA SCREENING: HCPCS | Performed by: PHYSICIAN ASSISTANT

## 2022-10-12 PROCEDURE — 90471 IMMUNIZATION ADMIN: CPT | Performed by: PHYSICIAN ASSISTANT

## 2022-10-12 ASSESSMENT — ENCOUNTER SYMPTOMS
MYALGIAS: 0
ABDOMINAL PAIN: 0
NAUSEA: 0
FREQUENCY: 0
ARTHRALGIAS: 0
JOINT SWELLING: 0
HEMATURIA: 0
FEVER: 0
HEARTBURN: 0
HEADACHES: 0
PALPITATIONS: 0
DYSURIA: 0
PARESTHESIAS: 0
WEAKNESS: 0
HEMATOCHEZIA: 0
EYE PAIN: 0
DIARRHEA: 0
SHORTNESS OF BREATH: 0
COUGH: 0
DIZZINESS: 0
NERVOUS/ANXIOUS: 0
CHILLS: 0
SORE THROAT: 0
CONSTIPATION: 0

## 2022-10-12 ASSESSMENT — PAIN SCALES - GENERAL: PAINLEVEL: NO PAIN (0)

## 2022-10-12 NOTE — PROGRESS NOTES
SUBJECTIVE:   CC: Ever is an 57 year old who presents for preventative health visit.     Patient of Dr. Elizabeth.   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  Last physical was in July 2021.    Overall he is doing well.  No acute concerns today.  3 children.  One with thyroid issues.  We will check TSH today.  Has been monitored by his PCP.    Diet well-balanced.  Exercises regularly with walking which he tracks on a fitbit, biking, and is a very active ski year.    Health Maintenance Screening:    -Specialists:  -Eye Doctor: Due for an eye exam.  Encouraged him to reestablish care.    -Dentist: Sees regularly.    -Dermatologist: Present a few years since he has seen a dermatologist.  Discussed self injects.  Referral placed for adult  Derm referral for a full body skin check.    -Immunizations:              -Influenza: Completed today.              -Pneumococcal: N/A              -Td/Tdap: Up-to-date.              -Shingles:  Up-to-date.                    -COVID: Has completed 2 COVID-19 immunizations.  Due for booster.  Declines at today's visit.     -Colon Cancer Screening:   Completed in 2016.  Due in 2026.  -Cholesterol Screening:  Check today.  -Diabetes Screening:   Fasting glucose last year 86.  No family history.  Not due.    Exercise: FitBit active with walking. Skiing. Biking.   Diet: Yes    Patient has been advised of split billing requirements and indicates understanding: Yes  Healthy Habits:     Getting at least 3 servings of Calcium per day:  Yes    Bi-annual eye exam:  NO    Dental care twice a year:  Yes    Sleep apnea or symptoms of sleep apnea:  None    Diet:  Regular (no restrictions)    Frequency of exercise:  6-7 days/week    Duration of exercise:  45-60 minutes    Taking medications regularly:  Yes    Medication side effects:  None    PHQ-2 Total Score: 0    Additional concerns today:  No      Today's PHQ-2 Score:   PHQ-2 ( 1999 Pfizer) 10/12/2022   Q1: Little interest or pleasure in  doing things 0   Q2: Feeling down, depressed or hopeless 0   PHQ-2 Score 0   PHQ-2 Total Score (12-17 Years)- Positive if 3 or more points; Administer PHQ-A if positive -   Q1: Little interest or pleasure in doing things Not at all   Q2: Feeling down, depressed or hopeless Not at all   PHQ-2 Score 0       Abuse: Current or Past(Physical, Sexual or Emotional)- No  Do you feel safe in your environment? Yes      Social History     Tobacco Use     Smoking status: Never     Smokeless tobacco: Never   Substance Use Topics     Alcohol use: No     Alcohol/week: 0.0 standard drinks     Comment: 0     If you drink alcohol do you typically have >3 drinks per day or >7 drinks per week? No    Alcohol Use 10/12/2022   Prescreen: >3 drinks/day or >7 drinks/week? Not Applicable   Prescreen: >3 drinks/day or >7 drinks/week? -       Last PSA:   PSA   Date Value Ref Range Status   10/10/2017 0.72 0 - 4 ug/L Final     Comment:     Assay Method:  Chemiluminescence using Siemens Vista analyzer     Prostate Specific Antigen Screen   Date Value Ref Range Status   07/23/2021 0.64 0.00 - 4.00 ug/L Final       Reviewed orders with patient. Reviewed health maintenance and updated orders accordingly - Yes  Lab work is in process  Patient Active Problem List   Diagnosis     Seasonal allergies     Hypothyroidism     Genital herpes     Past Surgical History:   Procedure Laterality Date     clavicular surgery for fx  2013     COLONOSCOPY N/A 3/10/2016    Procedure: COLONOSCOPY;  Surgeon: Frank Ayala MD;  Location:  GI     HERNIA REPAIR  2005    bilat     VASECTOMY  10/2008     ZZC ANESTH,REPAIR LO ABD HERNIA NOS      age less than 10 years       Social History     Tobacco Use     Smoking status: Never     Smokeless tobacco: Never   Substance Use Topics     Alcohol use: No     Alcohol/week: 0.0 standard drinks     Comment: 0     Family History   Problem Relation Age of Onset     Hydrocephalus Mother      Hyperlipidemia Father      Lipids  Father      FRANKIE. Paternal Grandmother         heart attack late 60's, no other heart disease in the family     Cancer - colorectal Paternal Uncle         but no closer fam hx of colon cancer     Diabetes No family hx of      Cerebrovascular Disease No family hx of      Breast Cancer No family hx of      Prostate Cancer No family hx of      Eye Disorder No family hx of         no glaucoma         Current Outpatient Medications   Medication Sig Dispense Refill     fluticasone (FLONASE) 50 MCG/ACT nasal spray Spray 1 spray into both nostrils as needed       No Known Allergies    Reviewed and updated as needed this visit by clinical staff   Tobacco  Allergies  Meds   Med Hx  Surg Hx  Fam Hx  Soc Hx        Reviewed and updated as needed this visit by Provider   Tobacco  Allergies  Meds   Med Hx  Surg Hx  Fam Hx  Soc Hx       Past Medical History:   Diagnosis Date     Genital herpes 2015    buttock paresthesia, urine symptoms     H/O colonoscopy 2016    nl     Hypothyroid 8/15    not on meds     Seasonal allergies       Past Surgical History:   Procedure Laterality Date     clavicular surgery for fx  2013     COLONOSCOPY N/A 3/10/2016    Procedure: COLONOSCOPY;  Surgeon: Frank Ayala MD;  Location:  GI     HERNIA REPAIR  2005    bilat     VASECTOMY  10/2008     ZZC ANESTH,REPAIR LO ABD HERNIA NOS      age less than 10 years       Review of Systems  CONSTITUTIONAL: NEGATIVE for fever, chills, change in weight  INTEGUMENTARY/SKIN: NEGATIVE for worrisome rashes, moles or lesions  EYES: NEGATIVE for vision changes or irritation  ENT: NEGATIVE for ear, mouth and throat problems  RESP: NEGATIVE for significant cough or SOB  CV: NEGATIVE for chest pain, palpitations or peripheral edema  GI: NEGATIVE for nausea, abdominal pain, heartburn, or change in bowel habits   male: negative for dysuria, hematuria, decreased urinary stream, erectile dysfunction, urethral discharge  MUSCULOSKELETAL: NEGATIVE for  "significant arthralgias or myalgia  NEURO: NEGATIVE for weakness, dizziness or paresthesias  PSYCHIATRIC: NEGATIVE for changes in mood or affect    OBJECTIVE:   BP 99/67 (BP Location: Right arm, Patient Position: Sitting, Cuff Size: Adult Large)   Pulse 63   Temp 97.1  F (36.2  C) (Tympanic)   Resp 16   Ht 1.816 m (5' 11.5\")   Wt 87.2 kg (192 lb 3.2 oz)   SpO2 96%   BMI 26.43 kg/m      Physical Exam  GENERAL: healthy, alert and no distress  EYES: Eyes grossly normal to inspection, PERRL and conjunctivae and sclerae normal  HENT: ear canals and TM's normal, nose and mouth without ulcers or lesions  NECK: no adenopathy, no asymmetry, masses, or scars and thyroid normal to palpation  RESP: lungs clear to auscultation - no rales, rhonchi or wheezes  CV: regular rate and rhythm, normal S1 S2, no S3 or S4, no murmur, click or rub, no peripheral edema and peripheral pulses strong  ABDOMEN: soft, nontender, no hepatosplenomegaly, no masses and bowel sounds normal  MS: no gross musculoskeletal defects noted, no edema  SKIN: no suspicious lesions or rashes  NEURO: Normal strength and tone, mentation intact and speech normal  PSYCH: mentation appears normal, affect normal/bright    Diagnostic Test Results:  Labs reviewed in Epic    ASSESSMENT/PLAN:   Ever was seen today for physical and imm/inj.    Diagnoses and all orders for this visit:    Annual physical exam    Annual physical blood work today.  We will contact him with results of the blood work and the plan going forward.  Plan for physical in 1 year.  Follow-up sooner if needed.    -     CBC with platelets and differential; Future  -     Comprehensive metabolic panel (BMP + Alb, Alk Phos, ALT, AST, Total. Bili, TP); Future  -     TSH with free T4 reflex; Future  -     Lipid panel reflex to direct LDL Fasting; Future  -     PSA, screen; Future    Need for prophylactic vaccination and inoculation against influenza    Flu shot given today.    -     INFLUENZA QUAD, " "RECOMBINANT, P-FREE (RIV4) (FLUBLOK) AGE 50-64 [KEI061]  -     INFLUENZA QUAD, RECOMBINANT, P-FREE (RIV4) (FLUBLOK)    Screening for prostate cancer    Will repeat PSA.  Last checked in 2021.  Continue to monitor.    -     PSA, screen; Future  -     PSA, screen    Screening for skin cancer    Referral placed to dermatology for full body skin check.  Discussed self skin checks.    -     Adult Dermatology Referral; Future        Patient has been advised of split billing requirements and indicates understanding: Yes    COUNSELING:   Reviewed preventive health counseling, as reflected in patient instructions       Regular exercise       Healthy diet/nutrition       Vision screening       Prostate cancer screening    Estimated body mass index is 26.43 kg/m  as calculated from the following:    Height as of this encounter: 1.816 m (5' 11.5\").    Weight as of this encounter: 87.2 kg (192 lb 3.2 oz).     Weight management plan: Discussed healthy diet and exercise guidelines    He reports that he has never smoked. He has never used smokeless tobacco.      Counseling Resources:  ATP IV Guidelines  Pooled Cohorts Equation Calculator  FRAX Risk Assessment  ICSI Preventive Guidelines  Dietary Guidelines for Americans, 2010  USDA's MyPlate  ASA Prophylaxis  Lung CA Screening    The likelihood of other entities in the differential is insufficient to justify any further testing for them at this time. This was explained to the patient. The patient was advised that persistent or worsening symptoms would require further evaluation. Patient advised to call the office and if unable to reach to go to the emergency room if they develop any new or worsening symptoms. Expressed understanding and agreement with above stated plan.     Josué Wilde PA-C  [employer]  "

## 2022-10-13 LAB
ALBUMIN SERPL-MCNC: 3.8 G/DL (ref 3.4–5)
ALP SERPL-CCNC: 86 U/L (ref 40–150)
ALT SERPL W P-5'-P-CCNC: 17 U/L (ref 0–70)
ANION GAP SERPL CALCULATED.3IONS-SCNC: 6 MMOL/L (ref 3–14)
AST SERPL W P-5'-P-CCNC: 23 U/L (ref 0–45)
BILIRUB SERPL-MCNC: 0.6 MG/DL (ref 0.2–1.3)
BUN SERPL-MCNC: 18 MG/DL (ref 7–30)
CALCIUM SERPL-MCNC: 9.1 MG/DL (ref 8.5–10.1)
CHLORIDE BLD-SCNC: 104 MMOL/L (ref 94–109)
CHOLEST SERPL-MCNC: 239 MG/DL
CO2 SERPL-SCNC: 27 MMOL/L (ref 20–32)
CREAT SERPL-MCNC: 1.12 MG/DL (ref 0.66–1.25)
FASTING STATUS PATIENT QL REPORTED: YES
GFR SERPL CREATININE-BSD FRML MDRD: 77 ML/MIN/1.73M2
GLUCOSE BLD-MCNC: 86 MG/DL (ref 70–99)
HDLC SERPL-MCNC: 66 MG/DL
LDLC SERPL CALC-MCNC: 159 MG/DL
NONHDLC SERPL-MCNC: 173 MG/DL
POTASSIUM BLD-SCNC: 4 MMOL/L (ref 3.4–5.3)
PROT SERPL-MCNC: 7.3 G/DL (ref 6.8–8.8)
PSA SERPL-MCNC: 0.79 UG/L (ref 0–4)
SODIUM SERPL-SCNC: 137 MMOL/L (ref 133–144)
T4 FREE SERPL-MCNC: 0.85 NG/DL (ref 0.76–1.46)
TRIGL SERPL-MCNC: 69 MG/DL
TSH SERPL DL<=0.005 MIU/L-ACNC: 6.71 MU/L (ref 0.4–4)

## 2023-03-13 ENCOUNTER — OFFICE VISIT (OUTPATIENT)
Dept: FAMILY MEDICINE | Facility: CLINIC | Age: 58
End: 2023-03-13
Attending: PHYSICIAN ASSISTANT
Payer: COMMERCIAL

## 2023-03-13 DIAGNOSIS — L81.4 LENTIGINES: ICD-10-CM

## 2023-03-13 DIAGNOSIS — D18.01 CHERRY ANGIOMA: ICD-10-CM

## 2023-03-13 DIAGNOSIS — D22.9 MULTIPLE BENIGN NEVI: ICD-10-CM

## 2023-03-13 DIAGNOSIS — L71.9 ROSACEA: ICD-10-CM

## 2023-03-13 DIAGNOSIS — Z12.83 SCREENING FOR SKIN CANCER: ICD-10-CM

## 2023-03-13 DIAGNOSIS — L82.1 SEBORRHEIC KERATOSES: Primary | ICD-10-CM

## 2023-03-13 PROCEDURE — 99203 OFFICE O/P NEW LOW 30 MIN: CPT | Performed by: PHYSICIAN ASSISTANT

## 2023-03-13 ASSESSMENT — PAIN SCALES - GENERAL: PAINLEVEL: NO PAIN (0)

## 2023-03-13 NOTE — PROGRESS NOTES
[facility] Dermatology Note  Encounter Date: Mar 13, 2023  Office Visit      Dermatology Problem List:  1. 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 Patient encouraged to perform monthly self skin exams and educated on how to perform them. UV precautions reviewed with patient. Patient was asked about new or changing moles/lesions on body.   - Sunscreen: Apply 20 minutes prior to going outdoors and reapply every two hours, when wet or sweating. We recommend using an SPF 30 or higher, and to use one that is water resistant.     - RTC for changes    Procedures Performed:   None    Follow-up: 1-2 year(s) in-person, or earlier for new or changing lesions    Staff:     All risks, benefits and alternatives were discussed with patient.  Patient is in agreement and understands the assessment and plan.  All questions were answered.    Vaishali Llamas PA-C, [facility]  [facility]: Phone: [phone], Fax: [phone]  [facility]: Phone: [phone],  Fax: [phone]  [facility]: Phone: [phone], Fax: [phone]  ____________________________________________    CC: Skin Check ([facility]. Family hx of )      HPI:  Mr. Ever Garnica is a 57 year old male who presents today as a new patient for a FBSE. Referred by his PCP, Josué Wilde PA-C for skin exam. Noticed a spot on the nose which has resolved a bit since making the appointment. No associated sx.     Patient is otherwise feeling well, without additional concerns.    Labs:  none    Physical  Exam:  Vitals: There were no vitals taken for this visit.  SKIN: Full skin, which includes the head/face, both arms, chest, back, abdomen,both legs, genitalia and/or groin buttocks, digits and/or nails, was examined.   - Herring's skin type II, <100 nevi  - There are dome shaped bright red papules on the trunk.   - Multiple regular brown pigmented macules and papules are identified on the trunk and extremities.   - Scattered brown macules on sun exposed areas.  - There are waxy stuck on tan to brown papules on the R flank x1.   - No other lesions of concern on areas examined.     Medications:  Current Outpatient Medications   Medication     fluticasone (FLONASE) 50 MCG/ACT nasal spray     No current facility-administered medications for this visit.      Past Medical/Surgical History:   Patient Active Problem List   Diagnosis     Seasonal allergies     Hypothyroidism     Genital herpes     Past Medical History:   Diagnosis Date     Genital herpes 2015    buttock paresthesia, urine symptoms     H/O colonoscopy 2016    nl     Hypothyroid 8/15    not on meds     Seasonal allergies

## 2023-03-13 NOTE — LETTER
3/13/2023         RE: Ever Garnica  [address]  [place] [address]        Dear Colleague,    Thank you for referring your patient, Ever Garnica, to the [facility] [place]. Please see a copy of my visit note below.    [facility] Dermatology Note  Encounter Date: Mar 13, 2023  Office Visit      Dermatology Problem List:  1. 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 Patient encouraged to perform monthly self skin exams and educated on how to perform them. UV precautions reviewed with patient. Patient was asked about new or changing moles/lesions on body.   - Sunscreen: Apply 20 minutes prior to going outdoors and reapply every two hours, when wet or sweating. We recommend using an SPF 30 or higher, and to use one that is water resistant.     - RTC for changes    Procedures Performed:   None    Follow-up: 1-2 year(s) in-person, or earlier for new or changing lesions    Staff:     All risks, benefits and alternatives were discussed with patient.  Patient is in agreement and understands the assessment and plan.  All questions were answered.    Vaishali Llamas PA-C, MPAS  [facility]: Phone: [phone], Fax: [phone]  [facility]: Phone: [phone],  Fax: [phone]  [facility]: Phone: [phone], Fax: [phone]  ____________________________________________    CC: Skin Check ([facility]. Family hx of )      HPI:  Mr. Ever Garnica is a 57 year old male who presents today as a new patient for a  FBSE. Referred by his PCP, Josué Wilde PA-C for skin exam. Noticed a spot on the nose which has resolved a bit since making the appointment. No associated sx.     Patient is otherwise feeling well, without additional concerns.    Labs:  none    Physical Exam:  Vitals: There were no vitals taken for this visit.  SKIN: Full skin, which includes the head/face, both arms, chest, back, abdomen,both legs, genitalia and/or groin buttocks, digits and/or nails, was examined.   - Herring's skin type II, <100 nevi  - There are dome shaped bright red papules on the trunk.   - Multiple regular brown pigmented macules and papules are identified on the trunk and extremities.   - Scattered brown macules on sun exposed areas.  - There are waxy stuck on tan to brown papules on the R flank x1.   - No other lesions of concern on areas examined.     Medications:  Current Outpatient Medications   Medication     fluticasone (FLONASE) 50 MCG/ACT nasal spray     No current facility-administered medications for this visit.      Past Medical/Surgical History:   Patient Active Problem List   Diagnosis     Seasonal allergies     Hypothyroidism     Genital herpes     Past Medical History:   Diagnosis Date     Genital herpes 2015    buttock paresthesia, urine symptoms     H/O colonoscopy 2016    nl     Hypothyroid 8/15    not on meds     Seasonal allergies                           Again, thank you for allowing me to participate in the care of your patient.        Sincerely,        Vaishali Llamas PA-C

## 2023-03-21 ENCOUNTER — OFFICE VISIT (OUTPATIENT)
Dept: PEDIATRICS | Facility: CLINIC | Age: 58
End: 2023-03-21
Payer: COMMERCIAL

## 2023-03-21 ENCOUNTER — NURSE TRIAGE (OUTPATIENT)
Dept: NURSING | Facility: CLINIC | Age: 58
End: 2023-03-21
Payer: COMMERCIAL

## 2023-03-21 VITALS
OXYGEN SATURATION: 98 % | TEMPERATURE: 98.4 F | HEART RATE: 62 BPM | BODY MASS INDEX: 27.51 KG/M2 | SYSTOLIC BLOOD PRESSURE: 128 MMHG | WEIGHT: 200 LBS | DIASTOLIC BLOOD PRESSURE: 83 MMHG | RESPIRATION RATE: 20 BRPM

## 2023-03-21 DIAGNOSIS — I44.0 PROLONGED PR INTERVAL: ICD-10-CM

## 2023-03-21 DIAGNOSIS — R07.89 CHEST DISCOMFORT: Primary | ICD-10-CM

## 2023-03-21 LAB
BASOPHILS # BLD AUTO: 0 10E3/UL (ref 0–0.2)
BASOPHILS NFR BLD AUTO: 1 %
EOSINOPHIL # BLD AUTO: 0.1 10E3/UL (ref 0–0.7)
EOSINOPHIL NFR BLD AUTO: 3 %
ERYTHROCYTE [DISTWIDTH] IN BLOOD BY AUTOMATED COUNT: 12.7 % (ref 10–15)
HCT VFR BLD AUTO: 42.5 % (ref 40–53)
HGB BLD-MCNC: 15.1 G/DL (ref 13.3–17.7)
IMM GRANULOCYTES # BLD: 0 10E3/UL
IMM GRANULOCYTES NFR BLD: 0 %
LYMPHOCYTES # BLD AUTO: 1.4 10E3/UL (ref 0.8–5.3)
LYMPHOCYTES NFR BLD AUTO: 28 %
MCH RBC QN AUTO: 30.9 PG (ref 26.5–33)
MCHC RBC AUTO-ENTMCNC: 35.5 G/DL (ref 31.5–36.5)
MCV RBC AUTO: 87 FL (ref 78–100)
MONOCYTES # BLD AUTO: 0.6 10E3/UL (ref 0–1.3)
MONOCYTES NFR BLD AUTO: 11 %
NEUTROPHILS # BLD AUTO: 3 10E3/UL (ref 1.6–8.3)
NEUTROPHILS NFR BLD AUTO: 58 %
PLATELET # BLD AUTO: 191 10E3/UL (ref 150–450)
RBC # BLD AUTO: 4.89 10E6/UL (ref 4.4–5.9)
TROPONIN T SERPL HS-MCNC: 7 NG/L
WBC # BLD AUTO: 5.1 10E3/UL (ref 4–11)

## 2023-03-21 PROCEDURE — 93000 ELECTROCARDIOGRAM COMPLETE: CPT | Performed by: FAMILY MEDICINE

## 2023-03-21 PROCEDURE — 84484 ASSAY OF TROPONIN QUANT: CPT | Performed by: FAMILY MEDICINE

## 2023-03-21 PROCEDURE — 85025 COMPLETE CBC W/AUTO DIFF WBC: CPT | Performed by: FAMILY MEDICINE

## 2023-03-21 PROCEDURE — 36415 COLL VENOUS BLD VENIPUNCTURE: CPT | Performed by: FAMILY MEDICINE

## 2023-03-21 PROCEDURE — 99214 OFFICE O/P EST MOD 30 MIN: CPT | Performed by: FAMILY MEDICINE

## 2023-03-21 ASSESSMENT — PAIN SCALES - GENERAL: PAINLEVEL: MILD PAIN (2)

## 2023-03-21 NOTE — TELEPHONE ENCOUNTER
Please triage this and see if appropriate for clinic, or ads, vs emergency room.  If not currently having chest pain and stable then ok here if openings, if not let me know.    Frank Elizabeth M.D.

## 2023-03-21 NOTE — TELEPHONE ENCOUNTER
Huddled with [facility] who will see pt. Gave warm handoff, and Sheeba from [facility] will call pt to come in.  Terrie Grover, RN  [facility] RN Triage Team

## 2023-03-21 NOTE — PROGRESS NOTES
"    HPI     Chest Pain  Onset/Duration: occurring at random intervals, a month ago, Saturday and today  Description:   Location: midsternal   Character: achey  Radiation: nope  Duration: constant   Intensity: 1/10  Progression of Symptoms: constant  Accompanying Signs & Symptoms:  Shortness of breath: No  Sweating: No  Nausea/vomiting: No  Palpitations: No  Fever/Chills: No  Cough: No           Heartburn: No  History:   Family history of heart disease: \"dad's mom had a heart attack\". None with early heart disease.   Tobacco use: No  Previous similar symptoms: no   Precipitating factors:   Worse with exertion: No  Worse with deep breaths: No           Related to eating: No           Better with burping: No  Alleviating factors: \"only happened three times, vigerous walk and felt better\"  Therapies tried and outcome: nothing tried            "

## 2023-03-21 NOTE — PROGRESS NOTES
Assessment & Plan     Chest discomfort  - EKG 12-lead complete w/read - Clinics  - Troponin T, High Sensitivity  - CBC with platelets and differential  - Troponin T, High Sensitivity  - CBC with platelets and differential     No significant risk factors and patient presents with normal vitals.   Low suspicion for infection after reviewing hx and performing exam. Troponin returning in the normal range suggesting against ACS.     WE discussed stress echo and cardiac calcium CT are options if he wishes to pursue further testing but will need to discuss with his PCP.     Discussed that mid sternal pain is common most often is attributed to inflammation which should subside with time.   Can certainly try OTC analgesics.       Patient advised to seek medical attention right away if symptoms develop    Prolonged UT interval  Hx of endurance exercises most likely cause of finding on EKG. No follow-up required.     Karl Leblanc MD   [facility]    Munir Curtis is a 57 year old male who presents to clinic today for the following health issues:  Chief Complaint   Patient presents with     Chest Pain     Chest pain - midsternal     HPI    Reporting a few episodes of chest discomfort in the last month, today being the third. Typically last 2-3 hours of a 1-2/10 discomfort in the sternal area; today's episode started early morning and has been present for about 4 hours.    Had a cold in February which was mild.     Went on bike ride Sunday without any issues. Has not had any exertional discomfort    Denies leg pain/swelling  No recent cough  Denies fevers   No hx of arrhythmias. Denies heart palpitations or periods of rapid heart racing    Has mild hx of reflux, these symptoms feel different.      No personal or FH of early heart disease      Patient Active Problem List    Diagnosis Date Noted     Genital herpes      Priority: Medium     buttock paresthesia, urine symptoms       Hypothyroidism       Priority: Medium     added med then       Seasonal allergies      Priority: Medium       Current Outpatient Medications   Medication     fluticasone (FLONASE) 50 MCG/ACT nasal spray     No current facility-administered medications for this visit.           Objective    /83 (BP Location: Left arm, Patient Position: Chair, Cuff Size: Adult Regular)   Pulse 62   Temp 98.4  F (36.9  C) (Oral)   Resp 20   Wt 90.7 kg (200 lb)   SpO2 98%   BMI 27.51 kg/m    Physical Exam   Chest: no pain reproduced with palpation of the sternal area or providing resistance engaging the pec muscles  CV: RRR no m/r/g  Pulm: clear bilaterally  GEN: NAD    EKG:  , sinus, no Acute ST-T abnormalities    Results for orders placed or performed in visit on 03/21/23   Troponin T, High Sensitivity     Status: Normal   Result Value Ref Range    Troponin T, High Sensitivity 7 <=22 ng/L   CBC with platelets and differential     Status: None   Result Value Ref Range    WBC Count 5.1 4.0 - 11.0 10e3/uL    RBC Count 4.89 4.40 - 5.90 10e6/uL    Hemoglobin 15.1 13.3 - 17.7 g/dL    Hematocrit 42.5 40.0 - 53.0 %    MCV 87 78 - 100 fL    MCH 30.9 26.5 - 33.0 pg    MCHC 35.5 31.5 - 36.5 g/dL    RDW 12.7 10.0 - 15.0 %    Platelet Count 191 150 - 450 10e3/uL    % Neutrophils 58 %    % Lymphocytes 28 %    % Monocytes 11 %    % Eosinophils 3 %    % Basophils 1 %    % Immature Granulocytes 0 %    Absolute Neutrophils 3.0 1.6 - 8.3 10e3/uL    Absolute Lymphocytes 1.4 0.8 - 5.3 10e3/uL    Absolute Monocytes 0.6 0.0 - 1.3 10e3/uL    Absolute Eosinophils 0.1 0.0 - 0.7 10e3/uL    Absolute Basophils 0.0 0.0 - 0.2 10e3/uL    Absolute Immature Granulocytes 0.0 <=0.4 10e3/uL   CBC with platelets and differential     Status: None    Narrative    The following orders were created for panel order CBC with platelets and differential.  Procedure                               Abnormality         Status                     ---------                                -----------         ------                     CBC with platelets and d...[[id number]]                      Final result                 Please view results for these tests on the individual orders.              The use of Dragon/PowerMic dictation services may have been used to construct the content in this note; any grammatical or spelling errors are non-intentional. Please contact the author of this note directly if you are in need of any clarification.

## 2023-08-22 NOTE — TELEPHONE ENCOUNTER
"Nurse Triage SBAR    Is this a 2nd Level Triage? YES, LICENSED PRACTITIONER REVIEW IS REQUIRED    Situation: Pt calling with chest pain and tightness three separate times over the past month including today    Background: Pt had chest pain and tightness a month ago, on Saturday 3/18/2023, and this morning    Assessment: Pt reports the following:   For the last week or so he has had a \"weird feeling in his chest\"; pain and tightness  Location: The pain has been felt in different areas of his chest each time. Today he feels the pain on the left side of his chest    Duration: So far it has been 30 minutes today. He is currently out for a walk. He reports that exertion does not make the pain worse; that on Saturday he went for a walk and the pain went away. He went for a bike ride on Sunday and had no chest pain    Pain rated: 1-2/10    Hx of heart burn, but this does not feel like heart burn.   Roney recent illness: Pt reports that he had a cold about a month ago    Protocol Recommended Disposition:   Go To ED/UCC Now (Or To Office With PCP Approval)    Recommendation: Writer gave patient the care advice and is routing this message high priority to his PCP and pool to follow up with patient on advisement on how he should proceed/     Routed to provider    Does the patient meet one of the following criteria for ADS visit consideration? 16+ years old, with an [facility] PCP     TIP  Providers, please consider if this condition is appropriate for management at one of our Acute and Diagnostic Services sites.     If patient is a good candidate, please use dotphrase <dot>triageresponse and select Refer to ADS to document.  Reason for Disposition    Chest pain or 'angina' comes and goes and is happening more often (increasing in frequency) or getting worse (increasing in severity) (Exception: chest pains that last only a few seconds)    Additional Information    Negative: SEVERE difficulty breathing (e.g., struggling for each " breath, speaks in single words)    Negative: Passed out (i.e., fainted, collapsed and was not responding)    Negative: Difficult to awaken or acting confused (e.g., disoriented, slurred speech)    Negative: Shock suspected (e.g., cold/pale/clammy skin, too weak to stand, low BP, rapid pulse)    Negative: Chest pain lasting longer than 5 minutes and ANY of the following:* Over 44 years old* Over 30 years old and at least one cardiac risk factor (e.g., diabetes mellitus, high blood pressure, high cholesterol, smoker, or strong family history of heart disease)* History of heart disease (i.e., angina, heart attack, heart failure, bypass surgery, takes nitroglycerin)* Pain is crushing, pressure-like, or heavy    Negative: Heart beating < 50 beats per minute OR > 140 beats per minute    Negative: Visible sweat on face or sweat dripping down face    Negative: Sounds like a life-threatening emergency to the triager    Negative: Followed an injury to chest    Negative: SEVERE chest pain    Negative: Pain also in shoulder(s) or arm(s) or jaw    Negative: Difficulty breathing    Negative: Cocaine use within last 3 days    Negative: Major surgery in the past month    Negative: Hip or leg fracture (broken bone) in past month (or had cast on leg or ankle in past month)    Negative: Illness requiring prolonged bedrest in past month (e.g., immobilization, long hospital stay)    Negative: Long-distance travel in past month (e.g., car, bus, train, plane; with trip lasting 6 or more hours)    Negative: History of prior 'blood clot' in leg or lungs (i.e., deep vein thrombosis, pulmonary embolism)    Negative: History of inherited increased risk of blood clots (e.g., Factor 5 Leiden, Anti-thrombin 3, Protein C or Protein S deficiency, Prothrombin mutation)    Negative: Cancer treatment in the past two months (or has cancer now)    Negative: Heart beating irregularly or very rapidly    Negative: Patient says chest pain feels exactly the  same as previously diagnosed 'heartburn' and describes burning in chest and accompanying sour taste in mouth    Negative: Chest pain lasting longer than 5 minutes and occurred in last 3 days (72 hours) (Exception: feels exactly the same as previously diagnosed heartburn and has accompanying sour taste in mouth)    Negative: Dizziness or lightheadedness    Negative: Coughing up blood    Negative: Patient sounds very sick or weak to the triager    Protocols used: CHEST PAIN-A-OH    Olga Sandhu RN  [facility] Nurse Advisor 8:11 AM 3/21/2023   No

## 2023-10-19 ENCOUNTER — OFFICE VISIT (OUTPATIENT)
Dept: FAMILY MEDICINE | Facility: CLINIC | Age: 58
End: 2023-10-19
Payer: COMMERCIAL

## 2023-10-19 VITALS
OXYGEN SATURATION: 98 % | RESPIRATION RATE: 16 BRPM | HEIGHT: 72 IN | WEIGHT: 194 LBS | BODY MASS INDEX: 26.28 KG/M2 | TEMPERATURE: 97.7 F | HEART RATE: 56 BPM | SYSTOLIC BLOOD PRESSURE: 111 MMHG | DIASTOLIC BLOOD PRESSURE: 71 MMHG

## 2023-10-19 DIAGNOSIS — E78.5 HYPERLIPIDEMIA LDL GOAL <130: ICD-10-CM

## 2023-10-19 DIAGNOSIS — Z23 HIGH PRIORITY FOR 2019-NCOV VACCINE: ICD-10-CM

## 2023-10-19 DIAGNOSIS — R07.9 CHEST PAIN, UNSPECIFIED TYPE: ICD-10-CM

## 2023-10-19 DIAGNOSIS — E03.9 HYPOTHYROIDISM, UNSPECIFIED TYPE: ICD-10-CM

## 2023-10-19 DIAGNOSIS — Z00.00 ENCOUNTER FOR ANNUAL PHYSICAL EXAM: Primary | ICD-10-CM

## 2023-10-19 LAB
ALBUMIN SERPL BCG-MCNC: 4.1 G/DL (ref 3.5–5.2)
ALP SERPL-CCNC: 70 U/L (ref 40–129)
ALT SERPL W P-5'-P-CCNC: 11 U/L (ref 0–70)
ANION GAP SERPL CALCULATED.3IONS-SCNC: 9 MMOL/L (ref 7–15)
AST SERPL W P-5'-P-CCNC: 30 U/L (ref 0–45)
BILIRUB SERPL-MCNC: 0.5 MG/DL
BUN SERPL-MCNC: 19.5 MG/DL (ref 6–20)
CALCIUM SERPL-MCNC: 9.4 MG/DL (ref 8.6–10)
CHLORIDE SERPL-SCNC: 106 MMOL/L (ref 98–107)
CHOLEST SERPL-MCNC: 231 MG/DL
CREAT SERPL-MCNC: 1.16 MG/DL (ref 0.67–1.17)
DEPRECATED HCO3 PLAS-SCNC: 25 MMOL/L (ref 22–29)
EGFRCR SERPLBLD CKD-EPI 2021: 73 ML/MIN/1.73M2
ERYTHROCYTE [DISTWIDTH] IN BLOOD BY AUTOMATED COUNT: 12.8 % (ref 10–15)
GLUCOSE SERPL-MCNC: 98 MG/DL (ref 70–99)
HCT VFR BLD AUTO: 43.8 % (ref 40–53)
HDLC SERPL-MCNC: 67 MG/DL
HGB BLD-MCNC: 14.8 G/DL (ref 13.3–17.7)
LDLC SERPL CALC-MCNC: 151 MG/DL
MCH RBC QN AUTO: 30.2 PG (ref 26.5–33)
MCHC RBC AUTO-ENTMCNC: 33.8 G/DL (ref 31.5–36.5)
MCV RBC AUTO: 89 FL (ref 78–100)
NONHDLC SERPL-MCNC: 164 MG/DL
PLATELET # BLD AUTO: 217 10E3/UL (ref 150–450)
POTASSIUM SERPL-SCNC: 4.2 MMOL/L (ref 3.4–5.3)
PROT SERPL-MCNC: 6.9 G/DL (ref 6.4–8.3)
RBC # BLD AUTO: 4.9 10E6/UL (ref 4.4–5.9)
SODIUM SERPL-SCNC: 140 MMOL/L (ref 135–145)
T4 FREE SERPL-MCNC: 1.05 NG/DL (ref 0.9–1.7)
TRIGL SERPL-MCNC: 64 MG/DL
TROPONIN T SERPL HS-MCNC: <6 NG/L
TSH SERPL DL<=0.005 MIU/L-ACNC: 6.97 UIU/ML (ref 0.3–4.2)
WBC # BLD AUTO: 4.2 10E3/UL (ref 4–11)

## 2023-10-19 PROCEDURE — 90480 ADMN SARSCOV2 VAC 1/ONLY CMP: CPT | Performed by: INTERNAL MEDICINE

## 2023-10-19 PROCEDURE — 80053 COMPREHEN METABOLIC PANEL: CPT | Performed by: INTERNAL MEDICINE

## 2023-10-19 PROCEDURE — 85027 COMPLETE CBC AUTOMATED: CPT | Performed by: INTERNAL MEDICINE

## 2023-10-19 PROCEDURE — 99396 PREV VISIT EST AGE 40-64: CPT | Mod: 25 | Performed by: INTERNAL MEDICINE

## 2023-10-19 PROCEDURE — 84439 ASSAY OF FREE THYROXINE: CPT | Performed by: INTERNAL MEDICINE

## 2023-10-19 PROCEDURE — 36415 COLL VENOUS BLD VENIPUNCTURE: CPT | Performed by: INTERNAL MEDICINE

## 2023-10-19 PROCEDURE — 84443 ASSAY THYROID STIM HORMONE: CPT | Performed by: INTERNAL MEDICINE

## 2023-10-19 PROCEDURE — 99214 OFFICE O/P EST MOD 30 MIN: CPT | Mod: 25 | Performed by: INTERNAL MEDICINE

## 2023-10-19 PROCEDURE — 91320 SARSCV2 VAC 30MCG TRS-SUC IM: CPT | Performed by: INTERNAL MEDICINE

## 2023-10-19 PROCEDURE — 93000 ELECTROCARDIOGRAM COMPLETE: CPT | Performed by: INTERNAL MEDICINE

## 2023-10-19 PROCEDURE — 84484 ASSAY OF TROPONIN QUANT: CPT | Performed by: INTERNAL MEDICINE

## 2023-10-19 PROCEDURE — 80061 LIPID PANEL: CPT | Performed by: INTERNAL MEDICINE

## 2023-10-19 ASSESSMENT — ENCOUNTER SYMPTOMS
NAUSEA: 0
HEMATURIA: 0
HEMATOCHEZIA: 0
CONSTIPATION: 0
HEARTBURN: 0
WEAKNESS: 0
DIZZINESS: 0
ARTHRALGIAS: 0
DIARRHEA: 0
PALPITATIONS: 0
COUGH: 0
HEADACHES: 0
JOINT SWELLING: 0
DYSURIA: 0
PARESTHESIAS: 0
ABDOMINAL PAIN: 0
FEVER: 0
EYE PAIN: 0
FREQUENCY: 0
SHORTNESS OF BREATH: 0
NERVOUS/ANXIOUS: 0
MYALGIAS: 0
CHILLS: 0
SORE THROAT: 0

## 2023-10-19 ASSESSMENT — PAIN SCALES - GENERAL: PAINLEVEL: NO PAIN (0)

## 2023-10-19 NOTE — RESULT ENCOUNTER NOTE
It was very nice to see you for your physical.  You should be able to view your test results.    Your CBC your blood count is normal with no signs of anemia or blood disorders.  Your chemistry panel shows no diabetes.  Your blood salts, kidney tests, liver test, and proteins are normal.    Your TSH or thyroid test is just barely off.  However the other thyroid test called the free T4 is normal.  I do not think you need any medication but I will check this again in 6 months.    Lastly your cholesterol is high at 231.  The HDL or good cholesterol is fine but the LDL or bad is a bit high.  Please get the coronary CT scan and then we can make a decision whether or not statin therapy would be indicated in addition to diet and exercise.    Overall your tests look quite good.  Please let me know if you have questions.    Frank Elizabeth M.D.

## 2023-10-19 NOTE — PROGRESS NOTES
SUBJECTIVE:   CC: Ever is an 58 year old who presents for preventative health visit.     This is a pleasant 58-year-old who I am seeing for a physical but also has a couple of issues.    In March he had chest pain with a normal EKG.  This resolved and then he had a bit of it for 4 days since improvement.  Nothing makes it better or worse.  No respiratory or GI symptoms.  No fevers or night sweats.  Its not exertional.  It is fairly constant but at a lower level.    He wonders about screening for coronary disease, his father has a history of coronary disease.    He is otherwise feeling well.  He works out fairly regularly.  He is  and has 3 kids, a son who is 15 at home and 2 daughters at college.      Healthy Habits:     Getting at least 3 servings of Calcium per day:  Yes    Bi-annual eye exam:  Yes    Dental care twice a year:  Yes    Sleep apnea or symptoms of sleep apnea:  None    Diet:  Regular (no restrictions)    Frequency of exercise:  4-5 days/week    Duration of exercise:  45-60 minutes    Taking medications regularly:  Yes    Medication side effects:  None    Additional concerns today:  No      Today's PHQ-2 Score:       10/19/2023     7:26 AM   PHQ-2 ( 1999 Pfizer)   Q1: Little interest or pleasure in doing things 0   Q2: Feeling down, depressed or hopeless 0   PHQ-2 Score 0   Q1: Little interest or pleasure in doing things Not at all   Q2: Feeling down, depressed or hopeless Not at all   PHQ-2 Score 0                  Past Medical History:      Past Medical History:   Diagnosis Date    Genital herpes 2015    buttock paresthesia, urine symptoms    H/O colonoscopy 2016    nl    Hypothyroid 8/15    not on meds    Seasonal allergies              Past Surgical History:      Past Surgical History:   Procedure Laterality Date    clavicular surgery for fx  2013    COLONOSCOPY N/A 3/10/2016    Procedure: COLONOSCOPY;  Surgeon: Frank Ayala MD;  Location:  GI    HERNIA REPAIR  2005    bilat     VASECTOMY  10/2008    ZZC ANESTH,REPAIR LO ABD HERNIA NOS      age less than 10 years             Social History:     Social History     Socioeconomic History    Marital status:      Spouse name: Not on file    Number of children: 3    Years of education: Not on file    Highest education level: Not on file   Occupational History    Occupation: , Mathmetician     Employer: [employer]   Tobacco Use    Smoking status: Never    Smokeless tobacco: Never   Substance and Sexual Activity    Alcohol use: No     Alcohol/week: 0.0 standard drinks of alcohol     Comment: 0    Drug use: No    Sexual activity: Yes     Partners: Female   Other Topics Concern     Service No    Blood Transfusions No    Caffeine Concern Not Asked    Occupational Exposure Not Asked    Hobby Hazards Not Asked    Sleep Concern Not Asked    Stress Concern Not Asked    Weight Concern Not Asked    Special Diet Not Asked    Back Care Not Asked    Exercise No     Comment: not scheduled    Bike Helmet Not Asked    Seat Belt Yes    Self-Exams Not Asked    Parent/sibling w/ CABG, MI or angioplasty before 65F 55M? Not Asked   Social History Narrative    Not on file     Social Determinants of Health     Financial Resource Strain: Low Risk  (10/19/2023)    Financial Resource Strain     Within the past 12 months, have you or your family members you live with been unable to get utilities (heat, electricity) when it was really needed?: No   Food Insecurity: Low Risk  (10/19/2023)    Food Insecurity     Within the past 12 months, did you worry that your food would run out before you got money to buy more?: No     Within the past 12 months, did the food you bought just not last and you didn t have money to get more?: No   Transportation Needs: Low Risk  (10/19/2023)    Transportation Needs     Within the past 12 months, has lack of transportation kept you from medical appointments, getting your medicines, non-medical meetings or  "appointments, work, or from getting things that you need?: No   Physical Activity: Not on file   Stress: Not on file   Social Connections: Not on file   Interpersonal Safety: Not on file   Housing Stability: Low Risk  (10/19/2023)    Housing Stability     Do you have housing? : Yes     Are you worried about losing your housing?: No             Family History:   reviewed         Allergies:   No Known Allergies          Medications:     Current Outpatient Medications   Medication Sig Dispense Refill    fluticasone (FLONASE) 50 MCG/ACT nasal spray Spray 1 spray into both nostrils as needed                 Review of Systems:     The 10 point Review of Systems is negative other than noted in the HPI           Physical Exam:   Blood pressure 111/71, pulse 56, temperature 97.7  F (36.5  C), temperature source Temporal, resp. rate 16, height 1.816 m (5' 11.5\"), weight 88 kg (194 lb), SpO2 98%.    Exam:  Constitutional: healthy appearing, alert and in no distress  Heent: Normocephalic. Head without obvious masses or lesions. PERRLDC, EOMI. Mouth exam within normal limits: tongue, mucous membranes, posterior pharynx all normal, no lesions or abnormalities seen.  Tm's and canals within normal limits bilaterally. Neck supple, no nuchal rigidity or masses. No supraclavicular, or cervical adenopathy. Thyroid symmetric, no masses.  Cardiovascular: Regular rate and rhythm, no murmer, rub or gallops.  JVP not elevated, no edema.  Carotids within normal limits bilaterally, no bruits.  Respiratory: Normal respiratory effort.  Lungs clear, normal flow, no wheezing or crackles.  Breasts: Normal bilaterally.  No masses or lesions.  Nipples within normal limites.  No axillary lesions or nodes.  Gastrointestinal: Normal active bowel sounds.   Soft, not tender, no masses, guarding or rebound.  No hepatosplenomegaly.   Genitourinary: Rectal min bph  Musculoskeletal: extremities normal, no gross deformities noted.  I was able to somewhat " reproduce his chest discomfort by pushing on the left side of his sternum  Skin: no suspicious lesions or rashes   Neurologic: Mental status within normal limits.  Speech fluent.  No gross motor abnormalities and gait intact.  Psychiatric: mentation appears normal and affect normal.         Data:   Labs sent; EKG -sinus bradycardia with first-degree AV block, otherwise normal        Assessment:   Normal complete physical exam  Chest pain, I doubt cv, check stat trop, doubt PE, esophageal or pulmonary.  I suspect this is musculoskeletal.  If it does worsen he knows to go to the emergency room  Hyperlipidemia, labs today and coronary CT scan  Hypothyroidism, follow-up labs today  Healthcare maintenance         Plan:   Exercise and diet  To ER if chest pain worsens  Stress test  Coronary CT calcium score  Letter with labs  COVID booster  Flu shot at pharmacy  Up-to-date colon exam      Frank Elizabeth M.D.

## 2023-10-19 NOTE — PATIENT INSTRUCTIONS
If your chest pain worsens go to the emergency room.    Please get the ct scan and stress test.    Frank Elizabeth M.D.

## 2023-10-24 ENCOUNTER — OFFICE VISIT (OUTPATIENT)
Dept: FAMILY MEDICINE | Facility: CLINIC | Age: 58
End: 2023-10-24
Payer: COMMERCIAL

## 2023-10-24 VITALS
DIASTOLIC BLOOD PRESSURE: 67 MMHG | HEART RATE: 60 BPM | OXYGEN SATURATION: 96 % | RESPIRATION RATE: 15 BRPM | TEMPERATURE: 98.7 F | BODY MASS INDEX: 25.95 KG/M2 | SYSTOLIC BLOOD PRESSURE: 103 MMHG | HEIGHT: 72 IN | WEIGHT: 191.6 LBS

## 2023-10-24 DIAGNOSIS — Z71.84 ENCOUNTER FOR COUNSELING FOR TRAVEL: Primary | ICD-10-CM

## 2023-10-24 PROCEDURE — 99401 PREV MED CNSL INDIV APPRX 15: CPT | Mod: GA | Performed by: PHYSICIAN ASSISTANT

## 2023-10-24 RX ORDER — AZITHROMYCIN 500 MG/1
TABLET, FILM COATED ORAL
Qty: 3 TABLET | Refills: 0 | Status: SHIPPED | OUTPATIENT
Start: 2023-10-24

## 2023-10-24 NOTE — NURSING NOTE
Prior to immunization administration, verified patients identity using patient s name and date of birth. Please see Immunization Activity for additional information.     Screening Questionnaire for Adult Immunization    Are you sick today?   No   Do you have allergies to medications, food, a vaccine component or latex?   No   Have you ever had a serious reaction after receiving a vaccination?   No   Do you have a long-term health problem with heart, lung, kidney, or metabolic disease (e.g., diabetes), asthma, a blood disorder, no spleen, complement component deficiency, a cochlear implant, or a spinal fluid leak?  Are you on long-term aspirin therapy?   No   Do you have cancer, leukemia, HIV/AIDS, or any other immune system problem?   No   Do you have a parent, brother, or sister with an immune system problem?   No   In the past 3 months, have you taken medications that affect  your immune system, such as prednisone, other steroids, or anticancer drugs; drugs for the treatment of rheumatoid arthritis, Crohn s disease, or psoriasis; or have you had radiation treatments?   No   Have you had a seizure, or a brain or other nervous system problem?   No   During the past year, have you received a transfusion of blood or blood    products, or been given immune (gamma) globulin or antiviral drug?   No   For women: Are you pregnant or is there a chance you could become       pregnant during the next month?   No   Have you received any vaccinations in the past 4 weeks?   No     Immunization questionnaire answers were all negative.      Patient instructed to remain in clinic for 15 minutes afterwards, and to report any adverse reactions.     Screening performed by Mago Duff MA on 10/24/2023 at 7:29 AM.

## 2023-10-24 NOTE — PROGRESS NOTES
SUBJECTIVE: Ever Garnica , a 58 year old  male, presents for counseling and information regarding upcoming travel to [place] and [facility]. Special medical concerns include: none. He anticipates the following unusual exposures: none.    Itinerary:  [place] and [facility]    Departure Date: 10/30/23 Return date: 11/8/23    Reason for travel (i.e. Business, pleasure): business    Visiting an urban or rural area?: urban    Accommodations (i.e. hotel, hostel, friends, family, etc): hotel      IMMUNIZATION HISTORY  Have you received any vaccinations in the past 4 weeks?  No  Have you ever fainted from having your blood drawn or from an injection?  No  Have you ever had a fever reaction to vaccination?  No  Have you ever had any bad reaction or side effect from any vaccination?  No  Have you ever had hepatitis A or B vaccine?  unknown  Do you live (or work closely) with anyone who has AIDS, an AIDS-like condition, any other immune disorder or who is on chemotherapy for cancer?  No  Have you received any injection of immune globulin or any blood products during the past 12 months?  No    GENERAL MEDICAL HISTORY  Do you have a medical condition that warrants maintenance medication or physician follow-up?  No  Do you have a medical condition that is stable now, but that may recur while traveling?  unknown  Has your spleen been removed?  No  Have you had an acute illness or a fever in the past 48 hours?  No  Are you pregnant, or might you become pregnant on this trip?  Any chance of pregnancy?  No  Are you breastfeeding?  No  Do you have HIV, AIDS, an AIDS-like condition, any other immune disorder, leukemia or cancer?  No  Do you have a severe combined immunodeficiency disease?  No  Have you had your thymus gland removed or history of problems with your thymus, such as myasthenia gravis, DiGeorge syndrome, or thymoma?  No    Do you have severe thrombocytopenia (low platelet count) or a coagulation disorder?  No  Have you ever  had a convulsion, seizure, epilepsy, neurologic condition or brain infection?  No  Do you have any stomach conditions?  No  Do you have a G6PD deficiency?  No  Do you have severe renal or kidney impairment?  No  Do you have a history of psychiatric problems?  No  Do you have a problem with strange dreams and/or nightmares?  No  Do you have insomnia?  No  Do you have problems with vaginitis?  No  Do you have psoriasis?  No  Are you prone to motion sickness?  No  Have you ever had headaches, nausea, vomiting, or breathing problems from altitude exposure?  No      Past Medical History:   Diagnosis Date    Genital herpes 2015    buttock paresthesia, urine symptoms    H/O colonoscopy 2016    nl    Hypothyroid 8/15    not on meds    Seasonal allergies       Immunization History   Administered Date(s) Administered    COVID-19 12+ (2023-24) (Pfizer) 10/19/2023    COVID-19 MONOVALENT 12+ (Pfizer) 03/30/2021, 04/20/2021    HEPA 11/15/2006, 11/12/2007    HepB 10/29/2015, 02/09/2016, 04/26/2016    Influenza (IIV3) PF 11/15/2009, 10/15/2012, 10/31/2013, 10/09/2015, 10/01/2017, 09/26/2019, 09/21/2021    Influenza Vaccine 18-64 (Flublok) 10/12/2022    MMR 10/29/2015    TDAP Vaccine (Adacel) 11/15/2006, 10/29/2015    Typhoid IM 10/29/2015    Zoster recombinant adjuvanted (SHINGRIX) 07/23/2021, 01/25/2022       Current Outpatient Medications   Medication Sig Dispense Refill    fluticasone (FLONASE) 50 MCG/ACT nasal spray Spray 1 spray into both nostrils as needed       No Known Allergies     EXAM: deferred    Immunizations discussed include: Typhoid- declined  Malaria prophylaxis recommended: not needed  Symptomatic treatment for traveler's diarrhea: bismuth subsalicylate, loperamide/diphenoxylate, and azithromycin    ASSESSMENT/PLAN:    (Z71.84) Encounter for counseling for travel  (primary encounter diagnosis)    Comment: No vaccines today. Patient will return or follow-up with PCP as needed. Prophylaxis given for Traveler's  diarrhea and is not needed for Malaria. All questions were answered.     Plan: azithromycin (ZITHROMAX) 500 MG tablet              I have reviewed general recommendations for safe travel   including: food/water precautions, insect avoidance, safe sex   practices given high prevalence of HIV and other STDs,   roadway safety. Educational materials and links to the CDC   Traveler's health website have been provided.    Total time 15 minutes, greater than 50 percent in counseling   and coordination of care.

## 2023-12-21 ENCOUNTER — HOSPITAL ENCOUNTER (OUTPATIENT)
Dept: CARDIOLOGY | Facility: CLINIC | Age: 58
Discharge: HOME | End: 2023-12-21
Attending: INTERNAL MEDICINE | Admitting: INTERNAL MEDICINE
Payer: COMMERCIAL

## 2023-12-21 ENCOUNTER — TELEPHONE (OUTPATIENT)
Dept: FAMILY MEDICINE | Facility: CLINIC | Age: 58
End: 2023-12-21

## 2023-12-21 DIAGNOSIS — R94.39 ABNORMAL STRESS TEST: Primary | ICD-10-CM

## 2023-12-21 DIAGNOSIS — R07.9 CHEST PAIN, UNSPECIFIED TYPE: ICD-10-CM

## 2023-12-21 PROCEDURE — 93017 CV STRESS TEST TRACING ONLY: CPT

## 2023-12-21 PROCEDURE — 93018 CV STRESS TEST I&R ONLY: CPT | Performed by: INTERNAL MEDICINE

## 2023-12-21 PROCEDURE — 93016 CV STRESS TEST SUPVJ ONLY: CPT | Performed by: INTERNAL MEDICINE

## 2023-12-21 NOTE — TELEPHONE ENCOUNTER
Called patient regarding PCP message below. He is willing to do echo stress test.   Pended for your Review.

## 2023-12-21 NOTE — TELEPHONE ENCOUNTER
Called patient regarding PCP message below. He verbalized understanding. Gave number to call to schedule.

## 2023-12-21 NOTE — TELEPHONE ENCOUNTER
Please have patient contact us if he does not hear from cardiology within the week.    +Frank Elizabeth M.D.

## 2023-12-21 NOTE — TELEPHONE ENCOUNTER
Please call the patient regarding his stress test.  While it is likely normal I cannot say that for sure as there are some subtle changes.  I would like to order a different type of stress test, stress echo.  Please let me know if that would be okay to do.    Thank you    Frank Elizabeth M.D.

## 2023-12-22 ENCOUNTER — HOSPITAL ENCOUNTER (OUTPATIENT)
Dept: CARDIOLOGY | Facility: CLINIC | Age: 58
Discharge: HOME | End: 2023-12-22
Attending: INTERNAL MEDICINE | Admitting: INTERNAL MEDICINE
Payer: COMMERCIAL

## 2023-12-22 DIAGNOSIS — E78.5 HYPERLIPIDEMIA LDL GOAL <130: ICD-10-CM

## 2023-12-22 DIAGNOSIS — R07.9 CHEST PAIN, UNSPECIFIED TYPE: ICD-10-CM

## 2023-12-22 PROBLEM — R93.1 AGATSTON CORONARY ARTERY CALCIUM SCORE BETWEEN 200 AND 399: Status: ACTIVE | Noted: 2023-12-22

## 2023-12-22 PROCEDURE — 75571 CT HRT W/O DYE W/CA TEST: CPT | Mod: 26 | Performed by: INTERNAL MEDICINE

## 2023-12-22 PROCEDURE — 75571 CT HRT W/O DYE W/CA TEST: CPT

## 2023-12-22 NOTE — RESULT ENCOUNTER NOTE
Mr. Garnica,    As you can see your calcium score is 215 which is elevated.  This does not mean you will ever have heart issues but indicates a slightly higher risk.  To prevent problems as you can see in the report the recommendation is to add statin therapy plus low-dose aspirin.  Most people do quite well with statins although a small number can develop muscle pain.  We could always stop it if that were to occur.  Please send me a note back and let me know if it would be okay to send in a prescription for a medication like Lipitor.    Frank Elizabeth M.D.

## 2024-01-12 ENCOUNTER — HOSPITAL ENCOUNTER (OUTPATIENT)
Dept: CARDIOLOGY | Facility: CLINIC | Age: 59
Discharge: HOME | End: 2024-01-12
Attending: INTERNAL MEDICINE | Admitting: INTERNAL MEDICINE
Payer: COMMERCIAL

## 2024-01-12 DIAGNOSIS — R94.39 ABNORMAL STRESS TEST: ICD-10-CM

## 2024-01-12 PROCEDURE — 93350 STRESS TTE ONLY: CPT | Mod: 26 | Performed by: INTERNAL MEDICINE

## 2024-01-12 PROCEDURE — 255N000002 HC RX 255 OP 636: Performed by: INTERNAL MEDICINE

## 2024-01-12 PROCEDURE — 93325 DOPPLER ECHO COLOR FLOW MAPG: CPT | Mod: 26 | Performed by: INTERNAL MEDICINE

## 2024-01-12 PROCEDURE — 93016 CV STRESS TEST SUPVJ ONLY: CPT | Performed by: INTERNAL MEDICINE

## 2024-01-12 PROCEDURE — 93018 CV STRESS TEST I&R ONLY: CPT | Performed by: INTERNAL MEDICINE

## 2024-01-12 PROCEDURE — 93325 DOPPLER ECHO COLOR FLOW MAPG: CPT | Mod: TC

## 2024-01-12 PROCEDURE — 93321 DOPPLER ECHO F-UP/LMTD STD: CPT | Mod: 26 | Performed by: INTERNAL MEDICINE

## 2024-01-12 RX ADMIN — HUMAN ALBUMIN MICROSPHERES AND PERFLUTREN 9 ML: 10; .22 INJECTION, SOLUTION INTRAVENOUS at 15:30

## 2024-06-26 ENCOUNTER — LAB (OUTPATIENT)
Dept: LAB | Facility: CLINIC | Age: 59
End: 2024-06-26
Payer: COMMERCIAL

## 2024-06-26 DIAGNOSIS — E78.5 HYPERLIPIDEMIA LDL GOAL <130: ICD-10-CM

## 2024-06-26 LAB
CHOLEST SERPL-MCNC: 154 MG/DL
FASTING STATUS PATIENT QL REPORTED: YES
HDLC SERPL-MCNC: 65 MG/DL
LDLC SERPL CALC-MCNC: 74 MG/DL
NONHDLC SERPL-MCNC: 89 MG/DL
TRIGL SERPL-MCNC: 73 MG/DL

## 2024-06-26 PROCEDURE — 80061 LIPID PANEL: CPT

## 2024-06-26 PROCEDURE — 36415 COLL VENOUS BLD VENIPUNCTURE: CPT

## 2024-06-27 ENCOUNTER — OFFICE VISIT (OUTPATIENT)
Dept: FAMILY MEDICINE | Facility: CLINIC | Age: 59
End: 2024-06-27
Payer: COMMERCIAL

## 2024-06-27 VITALS
HEART RATE: 50 BPM | BODY MASS INDEX: 25.76 KG/M2 | OXYGEN SATURATION: 98 % | WEIGHT: 190.2 LBS | DIASTOLIC BLOOD PRESSURE: 80 MMHG | TEMPERATURE: 98 F | HEIGHT: 72 IN | SYSTOLIC BLOOD PRESSURE: 130 MMHG | RESPIRATION RATE: 18 BRPM

## 2024-06-27 DIAGNOSIS — R93.1 AGATSTON CORONARY ARTERY CALCIUM SCORE BETWEEN 200 AND 399: Primary | ICD-10-CM

## 2024-06-27 DIAGNOSIS — E03.9 HYPOTHYROIDISM, UNSPECIFIED TYPE: ICD-10-CM

## 2024-06-27 DIAGNOSIS — E78.5 HYPERLIPIDEMIA LDL GOAL <100: ICD-10-CM

## 2024-06-27 PROCEDURE — 99212 OFFICE O/P EST SF 10 MIN: CPT | Performed by: INTERNAL MEDICINE

## 2024-06-27 ASSESSMENT — PAIN SCALES - GENERAL: PAINLEVEL: NO PAIN (0)

## 2024-06-27 NOTE — PROGRESS NOTES
The patient is here to follow-up for his positive coronary calcium score with hyperlipidemia.  As noted I added statin therapy which he is taking.  Some aches but he had some before.  He is exercising regularly.  He had a follow-up stress echo that was normal at a high level.    I discussed this with the patient.  He is currently doing super and works out regularly.  Occasionally he can have a twinge of chest discomfort that is very brief.  He works out to a high level with no difficulty.  I discussed with him that I think overall he is doing great.  If he is having more frequent or long-lasting chest pain he will let me know right away.  I have encouraged him to exercise.  I did suggest a baby aspirin daily or every other day per guidelines.  He has a follow-up in the fall and I will check his thyroid done.    Frank Elizabeth M.D.  16 minutes on the day of the encounter doing chart review, history and exam, documentation and further activities as noted above.

## 2024-10-24 ENCOUNTER — OFFICE VISIT (OUTPATIENT)
Dept: FAMILY MEDICINE | Facility: CLINIC | Age: 59
End: 2024-10-24
Payer: COMMERCIAL

## 2024-10-24 VITALS
SYSTOLIC BLOOD PRESSURE: 121 MMHG | RESPIRATION RATE: 16 BRPM | BODY MASS INDEX: 26.82 KG/M2 | WEIGHT: 198 LBS | OXYGEN SATURATION: 94 % | HEART RATE: 52 BPM | HEIGHT: 72 IN | TEMPERATURE: 97.1 F | DIASTOLIC BLOOD PRESSURE: 80 MMHG

## 2024-10-24 DIAGNOSIS — E78.5 HYPERLIPIDEMIA LDL GOAL <130: ICD-10-CM

## 2024-10-24 DIAGNOSIS — Z00.00 ENCOUNTER FOR ANNUAL PHYSICAL EXAM: Primary | ICD-10-CM

## 2024-10-24 DIAGNOSIS — Z23 HIGH PRIORITY FOR 2019-NCOV VACCINE: ICD-10-CM

## 2024-10-24 DIAGNOSIS — N39.43 URINARY DRIBBLING: ICD-10-CM

## 2024-10-24 DIAGNOSIS — R93.1 AGATSTON CORONARY ARTERY CALCIUM SCORE BETWEEN 200 AND 399: ICD-10-CM

## 2024-10-24 DIAGNOSIS — E03.9 HYPOTHYROIDISM, UNSPECIFIED TYPE: ICD-10-CM

## 2024-10-24 DIAGNOSIS — Z23 NEED FOR PROPHYLACTIC VACCINATION AND INOCULATION AGAINST INFLUENZA: ICD-10-CM

## 2024-10-24 LAB
ALBUMIN SERPL BCG-MCNC: 4.2 G/DL (ref 3.5–5.2)
ALP SERPL-CCNC: 79 U/L (ref 40–150)
ALT SERPL W P-5'-P-CCNC: 13 U/L (ref 0–70)
ANION GAP SERPL CALCULATED.3IONS-SCNC: 10 MMOL/L (ref 7–15)
AST SERPL W P-5'-P-CCNC: 31 U/L (ref 0–45)
BILIRUB SERPL-MCNC: 0.5 MG/DL
BUN SERPL-MCNC: 19.2 MG/DL (ref 8–23)
CALCIUM SERPL-MCNC: 9.4 MG/DL (ref 8.8–10.4)
CHLORIDE SERPL-SCNC: 105 MMOL/L (ref 98–107)
CHOLEST SERPL-MCNC: 164 MG/DL
CREAT SERPL-MCNC: 1.19 MG/DL (ref 0.67–1.17)
EGFRCR SERPLBLD CKD-EPI 2021: 70 ML/MIN/1.73M2
ERYTHROCYTE [DISTWIDTH] IN BLOOD BY AUTOMATED COUNT: 12.8 % (ref 10–15)
FASTING STATUS PATIENT QL REPORTED: YES
FASTING STATUS PATIENT QL REPORTED: YES
GLUCOSE SERPL-MCNC: 90 MG/DL (ref 70–99)
HCO3 SERPL-SCNC: 27 MMOL/L (ref 22–29)
HCT VFR BLD AUTO: 45.6 % (ref 40–53)
HDLC SERPL-MCNC: 72 MG/DL
HGB BLD-MCNC: 15.4 G/DL (ref 13.3–17.7)
LDLC SERPL CALC-MCNC: 80 MG/DL
MCH RBC QN AUTO: 30.3 PG (ref 26.5–33)
MCHC RBC AUTO-ENTMCNC: 33.8 G/DL (ref 31.5–36.5)
MCV RBC AUTO: 90 FL (ref 78–100)
NONHDLC SERPL-MCNC: 92 MG/DL
PLATELET # BLD AUTO: 205 10E3/UL (ref 150–450)
POTASSIUM SERPL-SCNC: 4.2 MMOL/L (ref 3.4–5.3)
PROT SERPL-MCNC: 7.1 G/DL (ref 6.4–8.3)
RBC # BLD AUTO: 5.08 10E6/UL (ref 4.4–5.9)
SODIUM SERPL-SCNC: 142 MMOL/L (ref 135–145)
T4 FREE SERPL-MCNC: 0.95 NG/DL (ref 0.9–1.7)
TRIGL SERPL-MCNC: 59 MG/DL
TSH SERPL DL<=0.005 MIU/L-ACNC: 6.92 UIU/ML (ref 0.3–4.2)
WBC # BLD AUTO: 4.1 10E3/UL (ref 4–11)

## 2024-10-24 PROCEDURE — 90480 ADMN SARSCOV2 VAC 1/ONLY CMP: CPT | Performed by: INTERNAL MEDICINE

## 2024-10-24 PROCEDURE — 36415 COLL VENOUS BLD VENIPUNCTURE: CPT | Performed by: INTERNAL MEDICINE

## 2024-10-24 PROCEDURE — 80053 COMPREHEN METABOLIC PANEL: CPT | Performed by: INTERNAL MEDICINE

## 2024-10-24 PROCEDURE — 90471 IMMUNIZATION ADMIN: CPT | Performed by: INTERNAL MEDICINE

## 2024-10-24 PROCEDURE — 84443 ASSAY THYROID STIM HORMONE: CPT | Performed by: INTERNAL MEDICINE

## 2024-10-24 PROCEDURE — 80061 LIPID PANEL: CPT | Performed by: INTERNAL MEDICINE

## 2024-10-24 PROCEDURE — 90656 IIV3 VACC NO PRSV 0.5 ML IM: CPT | Performed by: INTERNAL MEDICINE

## 2024-10-24 PROCEDURE — 91320 SARSCV2 VAC 30MCG TRS-SUC IM: CPT | Performed by: INTERNAL MEDICINE

## 2024-10-24 PROCEDURE — 99396 PREV VISIT EST AGE 40-64: CPT | Mod: 25 | Performed by: INTERNAL MEDICINE

## 2024-10-24 PROCEDURE — 85027 COMPLETE CBC AUTOMATED: CPT | Performed by: INTERNAL MEDICINE

## 2024-10-24 PROCEDURE — 84439 ASSAY OF FREE THYROXINE: CPT | Performed by: INTERNAL MEDICINE

## 2024-10-24 RX ORDER — ATORVASTATIN CALCIUM 20 MG/1
20 TABLET, FILM COATED ORAL DAILY
Qty: 90 TABLET | Refills: 3 | Status: SHIPPED | OUTPATIENT
Start: 2024-10-24

## 2024-10-24 SDOH — HEALTH STABILITY: PHYSICAL HEALTH: ON AVERAGE, HOW MANY DAYS PER WEEK DO YOU ENGAGE IN MODERATE TO STRENUOUS EXERCISE (LIKE A BRISK WALK)?: 7 DAYS

## 2024-10-24 ASSESSMENT — SOCIAL DETERMINANTS OF HEALTH (SDOH): HOW OFTEN DO YOU GET TOGETHER WITH FRIENDS OR RELATIVES?: TWICE A WEEK

## 2024-10-24 ASSESSMENT — PAIN SCALES - GENERAL: PAINLEVEL_OUTOF10: NO PAIN (0)

## 2024-10-24 NOTE — PROGRESS NOTES
Preventive Care Visit  [facility] KAIA Elizabeth MD, Internal Medicine  Oct 24, 2024          Munir Curtis is a 59 year old, presenting for the following:    He is doing very well and working out about a high level now with no issues.    He has a girlfriend in [place].    He can have urinary dribbling which she has had for quite some time.               Past Medical History:      Past Medical History:   Diagnosis Date    Agatston coronary artery calcium score between 200 and 399 12/2023    215, then est ?positive, then neg est echo, added statin    Chest pain 10/2023    equivacle est, then neg est echo    Genital herpes 2015    buttock paresthesia, urine symptoms    H/O colonoscopy 2016    nl    Hypothyroid 08/2015    not on meds    Seasonal allergies              Past Surgical History:      Past Surgical History:   Procedure Laterality Date    clavicular surgery for fx  2013    COLONOSCOPY N/A 3/10/2016    Procedure: COLONOSCOPY;  Surgeon: Frank Ayala MD;  Location:  GI    HERNIA REPAIR  2005    bilat    VASECTOMY  10/2008    ZZC ANESTH,REPAIR LO ABD HERNIA NOS      age less than 10 years             Social History:     Social History     Socioeconomic History    Marital status:      Spouse name: Not on file    Number of children: 3    Years of education: Not on file    Highest education level: Not on file   Occupational History    Occupation: , Mathmetician     Employer: [employer]   Tobacco Use    Smoking status: Never    Smokeless tobacco: Never   Vaping Use    Vaping status: Never Used   Substance and Sexual Activity    Alcohol use: No     Alcohol/week: 0.0 standard drinks of alcohol     Comment: 0    Drug use: No    Sexual activity: Yes     Partners: Female   Other Topics Concern     Service No    Blood Transfusions No    Caffeine Concern Not Asked    Occupational Exposure Not Asked    Hobby Hazards Not Asked    Sleep Concern Not Asked    Stress  Concern Not Asked    Weight Concern Not Asked    Special Diet Not Asked    Back Care Not Asked    Exercise No     Comment: not scheduled    Bike Helmet Not Asked    Seat Belt Yes    Self-Exams Not Asked    Parent/sibling w/ CABG, MI or angioplasty before 65F 55M? Not Asked   Social History Narrative    Not on file     Social Drivers of Health     Financial Resource Strain: Low Risk  (10/24/2024)    Financial Resource Strain     Within the past 12 months, have you or your family members you live with been unable to get utilities (heat, electricity) when it was really needed?: No   Food Insecurity: Low Risk  (10/24/2024)    Food Insecurity     Within the past 12 months, did you worry that your food would run out before you got money to buy more?: No     Within the past 12 months, did the food you bought just not last and you didn t have money to get more?: No   Transportation Needs: Low Risk  (10/24/2024)    Transportation Needs     Within the past 12 months, has lack of transportation kept you from medical appointments, getting your medicines, non-medical meetings or appointments, work, or from getting things that you need?: No   Physical Activity: Unknown (10/24/2024)    Exercise Vital Sign     Days of Exercise per Week: 7 days     Minutes of Exercise per Session: Not on file   Stress: No Stress Concern Present (10/24/2024)    [place] [place] [employer] - Occupational Stress Questionnaire     Feeling of Stress : Only a little   Social Connections: Unknown (10/24/2024)    Social Connection and Isolation Panel [NHANES]     Frequency of Communication with Friends and Family: Not on file     Frequency of Social Gatherings with Friends and Family: Twice a week     Attends [employer] Services: Not on file     Active Member of Clubs or Organizations: Not on file     Attends Club or Organization Meetings: Not on file     Marital Status: Not on file   Interpersonal Safety: Low Risk  (10/24/2024)    Interpersonal  "Safety     Do you feel physically and emotionally safe where you currently live?: Yes     Within the past 12 months, have you been hit, slapped, kicked or otherwise physically hurt by someone?: No     Within the past 12 months, have you been humiliated or emotionally abused in other ways by your partner or ex-partner?: No   Housing Stability: Low Risk  (10/24/2024)    Housing Stability     Do you have housing? : Yes     Are you worried about losing your housing?: No             Family History:   reviewed         Allergies:   No Known Allergies          Medications:     Current Outpatient Medications   Medication Sig Dispense Refill    atorvastatin (LIPITOR) 20 MG tablet Take 1 tablet (20 mg) by mouth daily. 90 tablet 3    fluticasone (FLONASE) 50 MCG/ACT nasal spray Spray 1 spray into both nostrils as needed                 Review of Systems:     The 10 point Review of Systems is negative other than noted in the HPI           Physical Exam:   Blood pressure 121/80, pulse 52, temperature 97.1  F (36.2  C), temperature source Temporal, resp. rate 16, height 1.821 m (5' 11.69\"), weight 89.8 kg (198 lb), SpO2 94%.    Exam:  Constitutional: healthy appearing, alert and in no distress  Heent: Normocephalic. Head without obvious masses or lesions. PERRLDC, EOMI. Mouth exam within normal limits: tongue, mucous membranes, posterior pharynx all normal, no lesions or abnormalities seen.  Tm's and canals within normal limits bilaterally. Neck supple, no nuchal rigidity or masses. No supraclavicular, or cervical adenopathy. Thyroid symmetric, no masses.  Cardiovascular: Regular rate and rhythm, no murmer, rub or gallops.  JVP not elevated, no edema.  Carotids within normal limits bilaterally, no bruits.  Respiratory: Normal respiratory effort.  Lungs clear, normal flow, no wheezing or crackles.  Breasts: Normal bilaterally.  No masses or lesions.  Nipples within normal limites.  No axillary lesions or nodes.  Gastrointestinal: " Normal active bowel sounds.   Soft, not tender, no masses, guarding or rebound.  No hepatosplenomegaly.   Genitourinary: Rectal min to mod bph  Musculoskeletal: extremities normal, no gross deformities noted.  Skin: no suspicious lesions or rashes   Neurologic: Mental status within normal limits.  Speech fluent.  No gross motor abnormalities and gait intact.  Psychiatric: mentation appears normal and affect normal.         Data:   Labs sent        Assessment:   Normal complete physical exam  Positive coronary calcium score, on Crestor, exercise and diet  Hypothyroidism, TSH  Elevated cholesterol, on statin  Urinary dribbling, BPH  Healthcare maintenance         Plan:   Vaccines today  Exercise and diet  Letter with lab      Frank Elizabeth M.D.

## 2024-10-24 NOTE — RESULT ENCOUNTER NOTE
It was very nice to see you.  You should be able to view your test results.    Your CBC or blood count is normal with no signs of anemia or blood disorders.  Your chemistry panel shows no diabetes.  Your blood salts, liver tests, and proteins are normal.  Your creatinine or kidney test is just a hair higher this time and I am not concerned and can check it next year.    Your thyroid test her TSH is just barely off but the free T4 is normal.  There is nothing to do about this but again I will check it next year.    Your total cholesterol super at 164.  Your HDL or good cholesterol and LDL or bad cholesterol are very good as well.    I am happy to bring you this excellent report.  Please let me know if you have questions.    Frank Elizabeth M.D.

## 2024-10-28 ENCOUNTER — MYC MEDICAL ADVICE (OUTPATIENT)
Dept: FAMILY MEDICINE | Facility: CLINIC | Age: 59
End: 2024-10-28
Payer: COMMERCIAL

## 2025-07-09 ENCOUNTER — OFFICE VISIT (OUTPATIENT)
Dept: PODIATRY | Facility: CLINIC | Age: 60
End: 2025-07-09
Payer: COMMERCIAL

## 2025-07-09 VITALS — BODY MASS INDEX: 26.82 KG/M2 | HEIGHT: 72 IN | WEIGHT: 198 LBS

## 2025-07-09 DIAGNOSIS — G57.62 MORTON'S NEUROMA, LEFT: Primary | ICD-10-CM

## 2025-07-09 DIAGNOSIS — M79.672 LEFT FOOT PAIN: ICD-10-CM

## 2025-07-09 PROCEDURE — 99203 OFFICE O/P NEW LOW 30 MIN: CPT | Performed by: PODIATRIST

## 2025-07-09 NOTE — PROGRESS NOTES
ASSESSMENT:  Encounter Diagnoses   Name Primary?    Rufino's neuroma, left Yes    Left foot pain      MEDICAL DECISION MAKING:  His history and clinical exam are consistent with a left third intermetatarsal space neuroma or neuritis.  We reviewed conservative recommendations, most of which he is doing.  He has managed well over the summer without much pain, choosing activities carefully.  Ever explains he is most concerned about alpine skiing next winter, as his pain was most pronounced when wearing alpine ski boots.  He plans on working with his boot fitter.  I suggest the boots not cause side-to-side compression of the forefoot.  I also explained that a component of this pain might be from a compression neuropathy which could be caused by the dorsal straps or dwain and pressure on the dorsal medial lateral cutaneous nerves, as well as plantar forefoot pressure.  Arch support with a neuroma pad in the ski boot, if the boot accommodates.  I encouraged him to return to clinic early in the ski season if he is having some pain.  We certainly can consider a steroid injection.    Disclaimer: This note consists of symbols derived from keyboarding, dictation and/or voice recognition software. As a result, there may be errors in the script that have gone undetected. Please consider this when interpreting information found in this chart.    Josué Lozano DPM, FACFAS, MS    [place] Department of Podiatry/Foot & Ankle Surgery      ____________________________________________________________________    HPI:       Ever Garnica presents today reporting ongoing pain related to a Joy's neuroma, left foot.  He has dealt with pain for years.  Pain comes and goes and can be rated as high as an 8 out of 10  Previous orthoses, neuroma padding, quality shoes  He was evaluated and treated by Dr. Rafael Jones 12/5/2019 with diagnoses of bilateral neuromas.  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    Physical activities include cycling, alpine skiing, strength training and walking    *  Past Medical History:   Diagnosis Date    Agatston coronary artery calcium score between 200 and 399 12/2023    215, then est ?positive, then neg est echo, added statin    Chest pain 10/2023    equivacle est, then neg est echo    Genital herpes 2015    buttock paresthesia, urine symptoms    H/O colonoscopy 2016    nl    Hypothyroid 08/2015    not on meds    Seasonal allergies    *  *  Past Surgical History:   Procedure Laterality Date    clavicular surgery for fx  2013    COLONOSCOPY N/A 3/10/2016    Procedure: COLONOSCOPY;  Surgeon: Frank Ayala MD;  Location:  GI    HERNIA REPAIR  2005    bilat    VASECTOMY  10/2008    ZZC ANESTH,REPAIR LO ABD HERNIA NOS      age less than 10 years   *  *  Current Outpatient Medications   Medication Sig Dispense Refill    atorvastatin (LIPITOR) 20 MG tablet Take 1 tablet (20 mg) by mouth daily. 90 tablet 3    clindamycin (CLEOCIN T) 1 % external solution To the scalp nightly, ideally twice per day when able 60 mL 2    fluticasone (FLONASE) 50 MCG/ACT nasal spray Spray 1 spray into both nostrils as needed           EXAM:    Vitals: There were no vitals taken for this visit.  BMI: There is no height or weight on file to calculate BMI.    Vasc:      Pedal pulses are palpable for the dorsalis pedis posterior tibial artery, bilateral foot.  Capillary fill time </= 3 seconds  Pedal skin appears well-perfused  Neuro:      Positive Katalina's click left third intermetatarsal space  Light touch sensation intact to all sensory nerve distributions, bilateral foot.  No apparent spastic contractures or other deformity secondary to neurologic compromise.  Derm:      No calluses  No wounds   No worrisome lesions  MSK:      Bilateral lower extremity muscle strength presents is normal.  No gross deformities  Adequate ankle and subtalar joint range of motion  Calf:    Neg  for redness, swelling or tenderness

## 2025-07-09 NOTE — LETTER
7/9/2025      Ever Garnica  [address]  [place] [address]      Dear Colleague,    Thank you for referring your patient, Ever Garnica, to the [facility]. Please see a copy of my visit note below.    ASSESSMENT:  Encounter Diagnoses   Name Primary?     Rufino's neuroma, left Yes     Left foot pain      MEDICAL DECISION MAKING:  His history and clinical exam are consistent with a left third intermetatarsal space neuroma or neuritis.  We reviewed conservative recommendations, most of which he is doing.  He has managed well over the summer without much pain, choosing activities carefully.  Ever explains he is most concerned about alpine skiing next winter, as his pain was most pronounced when wearing alpine ski boots.  He plans on working with his boot fitter.  I suggest the boots not cause side-to-side compression of the forefoot.  I also explained that a component of this pain might be from a compression neuropathy which could be caused by the dorsal straps or dwain and pressure on the dorsal medial lateral cutaneous nerves, as well as plantar forefoot pressure.  Arch support with a neuroma pad in the ski boot, if the boot accommodates.  I encouraged him to return to clinic early in the ski season if he is having some pain.  We certainly can consider a steroid injection.    Disclaimer: This note consists of symbols derived from keyboarding, dictation and/or voice recognition software. As a result, there may be errors in the script that have gone undetected. Please consider this when interpreting information found in this chart.    Josué Lozano DPM, FACFAS, [facility] Department of Podiatry/Foot & Ankle Surgery      ____________________________________________________________________    HPI:       Ever Garnica presents today reporting ongoing pain related to a Joy's neuroma, left foot.  He has dealt with pain for years.  Pain comes and goes and can be  rated as high as an 8 out of 10  Previous orthoses, neuroma padding, quality shoes  He was evaluated and treated by Dr. Rafael Jones 12/5/2019 with diagnoses of bilateral neuromas.  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 cycling, alpine skiing, strength training and walking    *  Past Medical History:   Diagnosis Date     Agatston coronary artery calcium score between 200 and 399 12/2023    215, then est ?positive, then neg est echo, added statin     Chest pain 10/2023    equivacle est, then neg est echo     Genital herpes 2015    buttock paresthesia, urine symptoms     H/O colonoscopy 2016    nl     Hypothyroid 08/2015    not on meds     Seasonal allergies    *  *  Past Surgical History:   Procedure Laterality Date     clavicular surgery for fx  2013     COLONOSCOPY N/A 3/10/2016    Procedure: COLONOSCOPY;  Surgeon: Frank Ayala MD;  Location:  GI     HERNIA REPAIR  2005    bilat     VASECTOMY  10/2008     ZZC ANESTH,REPAIR LO ABD HERNIA NOS      age less than 10 years   *  *  Current Outpatient Medications   Medication Sig Dispense Refill     atorvastatin (LIPITOR) 20 MG tablet Take 1 tablet (20 mg) by mouth daily. 90 tablet 3     clindamycin (CLEOCIN T) 1 % external solution To the scalp nightly, ideally twice per day when able 60 mL 2     fluticasone (FLONASE) 50 MCG/ACT nasal spray Spray 1 spray into both nostrils as needed           EXAM:    Vitals: There were no vitals taken for this visit.  BMI: There is no height or weight on file to calculate BMI.    Vasc:      Pedal pulses are palpable for the dorsalis pedis posterior tibial artery, bilateral foot.  Capillary fill time </= 3 seconds  Pedal skin appears well-perfused  Neuro:      Positive Katalina's click left third intermetatarsal space  Light touch sensation intact to all sensory nerve distributions, bilateral foot.  No apparent spastic contractures or other  deformity secondary to neurologic compromise.  Derm:      No calluses  No wounds   No worrisome lesions  MSK:      Bilateral lower extremity muscle strength presents is normal.  No gross deformities  Adequate ankle and subtalar joint range of motion  Calf:    Neg for redness, swelling or tenderness        Again, thank you for allowing me to participate in the care of your patient.        Sincerely,        Josué Lozano DPM    Electronically signed

## 2025-07-09 NOTE — PATIENT INSTRUCTIONS
Thank you for choosing [facility] Podiatry / Foot & Ankle Surgery!    DR. ALEJANDRO'S CLINIC LOCATIONS:     [facility] TRIAGE LINE: [phone]   [address] APPOINTMENTS: [phone]   [place] [address] RADIOLOGY: [phone]   (Every other Tues - Wed - Fri PM) SET UP SURGERY: [phone]    PHYSICAL THERAPY: [phone]   [facility] BILLING QUESTIONS: [phone] [address] FAX: [phone]   [place] [address]    (Thurs & Fri AM)         JOY'S NEUROMA   Joy's neuroma is an enlargement or thickening of a nerve in the foot. It is also sometimes referred to as an intermetatarsal neuroma, interdigital neuroma, Joy's metatarsalgia (pain in the metatarsal head area), brent-neural fibrosis (scar tissue around a nerve) or entrapment neuropathy (abnormal nerve due to compression). A Joy's neuroma most commonly occurs in the third interspace between the third and fourth toes, followed by the second interspace between the second and third toes. Joy's neuromas have also occurred in the fourth and first interspaces, but these are rare. If you have a Joy's neuroma, there is a 15% chance it will occur bilaterally (on both feet). Joy's neuromas occur most commonly in women who are between 30 to 50 years old. The reason they are more common in women is thought to be due to the shoes women wear.   CAUSES: A Joy's neuroma is thought to be caused by trauma to the nerve, but scientists are still not sure about the exact cause of the trauma. The trauma may be caused by the metatarsal heads, the deep transverse intermetatarsal ligament (holds the metatarsal heads together) or an intermetatarsal bursa (fluid-filled sac). All of these structures can cause compression/trauma on the nerve which initially causes swelling and injury in the nerve. Over time if the compression/trauma continues, the nerve repairs itself with very fibrous tissue that leads to enlargement  "and thickening of the nerve. Other causes of trauma to the nerve may include; overpronation (foot rolls inward), hypermobility (too much motion), cavo varus (high arch foot) and excessive dorsiflexion (toes bend upward) of the toes. These biomechanical (howthe foot moves) factors may cause trauma to the nerve with every step. If the nerve becomes irritated and enlarged then it takes up more space and gets even more compressed and irritated. It becomes a vicious cycle.   SIGNS & SYMPTOMS  - Pain (sharp, stabbing, throbbing, shooting)   - Numbness   - Tingling or \"pins & needles\"   - Burning   - Cramping   - Feeling that you are stepping on something or that something is in your shoe   - Initially the symptoms may happen once in a while, but as the condition gets worse, the symptoms may happen all of the time   - It usually feels better by taking off your shoe and massaging your foot     DIAGNOSIS: Your podiatrist will ask many questions about your signs and symptoms and will perform a physical exam. Some of the exams may include a web space compression test. This is done by squeezing the metatarsals together with one hand and using the thumb and index finger of the other hand to compress the affected web space to reproduce the pain/symptoms. A palpable click (Katalina's click) is usually present. This test may also cause pain to shoot into the toes and that is called a Tinel's sign. Pool's test involves squeezing the metatarsals together and moving the toes up and down for 30 seconds. This will usually cause pain or it will bring on your other symptoms. Kinney's sign is positive when you stand and the affected toes spread apart. A Joy's neuroma is usually diagnosed based on the history and physical exam findings, but sometimes other tests such as an x-ray, ultrasound or an MRI are needed.   TREATMENT  1.  Footwear Changes: Wear shoes that are wide and deep in the toe box so they  do not put pressure on your " toes and metatarsals. Avoid wearing high heels because they cause increased pressure on the ball of your foot (forefoot).    2.  Metatarsal Pads: These help to lift and separate the metatarsal heads to take pressure off of the nerve. They are placed just behind where you feel the pain, not on top of the painful spot.   3.  Activity modification: For example, you may try swimming instead of running until your symptoms go away.   4.  Taping   5.  Icing   6.  NSAIDs (anti-inflammatories): aleve, ibuprofen, etc.   7.  Arch Supports or Orthotics: These help to control some of the abnormal motion in your feet. The abnormal motion can lead to extra torque and pressure on the nerve.   8.  Physical Therapy  9.  Cortisone Injection: Helps to decrease the size of the irritated, enlarged nerve.   10.  Sclerosing Alcohol Injection: Helps to destroy the nerve chemically, which causes permanent numbness    SURGERY  If conservative treatment does not help surgery may be needed. Surgery may involve cutting out the nerve or cutting the intermetatarsalligament. Studies have shown surgery has an 80-85% success rate.  Will result in numbness    PREVENTION  -Avoid wearing narrow, pointed toe shoes, or high heels